# Patient Record
Sex: MALE | Race: WHITE | NOT HISPANIC OR LATINO | Employment: FULL TIME | URBAN - METROPOLITAN AREA
[De-identification: names, ages, dates, MRNs, and addresses within clinical notes are randomized per-mention and may not be internally consistent; named-entity substitution may affect disease eponyms.]

---

## 2018-08-28 ENCOUNTER — HOSPITAL ENCOUNTER (EMERGENCY)
Facility: HOSPITAL | Age: 33
Discharge: HOME | End: 2018-08-28
Attending: EMERGENCY MEDICINE | Admitting: EMERGENCY MEDICINE
Payer: COMMERCIAL

## 2018-08-28 VITALS
WEIGHT: 238 LBS | OXYGEN SATURATION: 96 % | HEART RATE: 66 BPM | SYSTOLIC BLOOD PRESSURE: 140 MMHG | TEMPERATURE: 97.4 F | DIASTOLIC BLOOD PRESSURE: 77 MMHG | RESPIRATION RATE: 16 BRPM
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
## 2018-08-28 DIAGNOSIS — L03.90 CELLULITIS: ICD-10-CM

## 2018-08-28 PROCEDURE — 99281 EMR DPT VST MAYX REQ PHY/QHP: CPT

## 2018-08-28 RX ORDER — AMOXICILLIN AND CLAVULANATE POTASSIUM 875; 125 MG/1; MG/1
1 TABLET, FILM COATED ORAL EVERY 12 HOURS SCHEDULED
Qty: 10 TABLET | Refills: 0 | Status: SHIPPED | OUTPATIENT
Start: 2018-08-29 | End: 2018-09-03

## 2018-08-28 RX ORDER — AMOXICILLIN AND CLAVULANATE POTASSIUM 875; 125 MG/1; MG/1
1 TABLET, FILM COATED ORAL ONCE
Status: COMPLETED | OUTPATIENT
Start: 2018-08-28 | End: 2018-08-28

## 2018-08-28 RX ADMIN — AMOXICILLIN AND CLAVULANATE POTASSIUM 1 TABLET: 875; 125 TABLET, FILM COATED ORAL at 23:51

## 2018-08-29 NOTE — ED NOTES
Pt presents to ED with what appears to be a bite nemo surrounded with ecchymosis on back of left upper leg  Pt states he noticed it after his long bike ride yesterday  Denies fever or chills at home        Nicolasa Cleveland RN  08/28/18 [address] [employer] ANNY Paz  08/28/18 8719

## 2018-08-29 NOTE — ED PROVIDER NOTES
History  Chief Complaint   Patient presents with   [address]     area noted behind left thigh theat appears to be an insect bite, client circled it 3 hours ago     68-year-old white male up-to-date with tetanus presents for evaluation of inset bike on the posterior medial aspect of his left distal thigh  No fever, no streaking, no drainage, mild tenderness to palpation  Patient complains of rash in slight discomfort to the area  History provided by:  Patient  Insect Bite   Location:  Leg  Leg injury location:  L upper leg  Time since incident:  3 hours  Pain details:     Quality:  Aching and sore    Severity:  Mild    Timing:  Unable to specify    Progression:  Unchanged  Incident location:  Unable to specify  Tetanus status:  Up to date  Relieved by:  Nothing  Worsened by:  Nothing  Ineffective treatments:  OTC medications and rest  Associated symptoms: rash    Associated symptoms: no fever and no swelling        None       History reviewed  No pertinent past medical history  Past Surgical History:   Procedure Laterality Date    TONSILLECTOMY         History reviewed  No pertinent family history  I have reviewed and agree with the history as documented  Social History   Substance Use Topics    Smoking status: Never Smoker    Smokeless tobacco: Never Used    Alcohol use No        Review of Systems   Constitutional: Negative for chills and fever  HENT: Negative  Respiratory: Negative  Cardiovascular: Negative  Gastrointestinal: Negative  Genitourinary: Negative  Musculoskeletal: Negative  Skin: Positive for rash  Neurological: Negative  Hematological: Negative  Psychiatric/Behavioral: Negative  All other systems reviewed and are negative  Physical Exam  Physical Exam   Constitutional: He is oriented to person, place, and time  He appears well-developed and well-nourished  HENT:   Head: Normocephalic and atraumatic     Right Ear: External ear normal    Left Ear: External ear normal    Nose: Nose normal    Mouth/Throat: Oropharynx is clear and moist    Eyes: Conjunctivae and EOM are normal    Neck: Normal range of motion  Neck supple  Cardiovascular: Normal rate, regular rhythm and intact distal pulses  Pulmonary/Chest: Effort normal and breath sounds normal    Abdominal: Soft  Bowel sounds are normal    Musculoskeletal: Normal range of motion  Neurological: He is alert and oriented to person, place, and time  Skin: Skin is warm and dry  Capillary refill takes less than 2 seconds  Well circumscribed area of erythema and ecchymosis with a central insect bite nemo  No fever, no drainage, no streaking   Psychiatric: He has a normal mood and affect  His behavior is normal  Judgment and thought content normal    Nursing note and vitals reviewed  Vital Signs  ED Triage Vitals [08/28/18 2233]   Temperature Pulse Respirations Blood Pressure SpO2   (!) 97 4 °F (36 3 °C) 66 16 140/77 96 %      Temp Source Heart Rate Source Patient Position - Orthostatic VS BP Location FiO2 (%)   Tympanic Monitor Sitting Right arm --      Pain Score       1           Vitals:    08/28/18 2233   BP: 140/77   Pulse: 66   Patient Position - Orthostatic VS: Sitting       Visual Acuity      ED Medications  Medications   amoxicillin-clavulanate (AUGMENTIN) 875-125 mg per tablet 1 tablet (not administered)       Diagnostic Studies  Results Reviewed     None                 No orders to display              Procedures  Procedures       Phone Contacts  ED Phone Contact    ED Course                               MDM  CritCare Time    Disposition  Final diagnoses:   Insect bite   Cellulitis     Time reflects when diagnosis was documented in both MDM as applicable and the Disposition within this note     Time User Action Codes Description Comment    8/28/2018 11:44 PM Sagrario Solimano Said  DJIQ] Insect bite     8/28/2018 11:44 PM Sagrario Garcia [L03 [id number]] Cellulitis       ED Disposition     ED Disposition Condition Comment    Discharge  [facility] discharge to home/self care  Condition at discharge: Stable        Follow-up Information     Follow up With Specialties Details Why Contact Info Additional Information    [facility] Emergency Medicine In 2 days For wound re-check [address] [facility], [place], [place], [address]          Patient's Medications   Discharge Prescriptions    AMOXICILLIN-CLAVULANATE (AUGMENTIN) 875-125 MG PER TABLET    Take 1 tablet by mouth every 12 (twelve) hours for 5 days       Start Date: 8/29/2018 End Date: 9/3/2018       Order Dose: 1 tablet       Quantity: 10 tablet    Refills: 0     No discharge procedures on file      ED Provider  Electronically Signed by           Alycia Dorantes MD  08/28/18 5250

## 2018-08-29 NOTE — DISCHARGE INSTRUCTIONS
Cellulitis   WHAT YOU NEED TO KNOW:   Cellulitis is a skin infection caused by bacteria  Cellulitis may go away on its own or you may need treatment  Your healthcare provider may draw a [place] around the outside edges of your cellulitis  If your cellulitis spreads, your healthcare provider will see it outside of the [place]  DISCHARGE INSTRUCTIONS:   Call 911 if:   · You have sudden trouble breathing or chest pain  Return to the emergency department if:   · Your wound gets larger and more painful  · You feel a crackling under your skin when you touch it  · You have purple dots or bumps on your skin, or you see bleeding under your skin  · You have new swelling and pain in your legs  · The red, warm, swollen area gets larger  · You see red streaks coming from the infected area  Contact your healthcare provider if:   · You have a fever  · Your fever or pain does not go away or gets worse  · The area does not get smaller after 2 days of antibiotics  · Your skin is flaking or peeling off  · You have questions or concerns about your condition or care  Medicines:   · Antibiotics  help treat the bacterial infection  · NSAIDs , such as ibuprofen, help decrease swelling, pain, and fever  NSAIDs can cause stomach bleeding or kidney problems in certain people  If you take blood thinner medicine, always ask if NSAIDs are safe for you  Always read the medicine label and follow directions  Do not give these medicines to children under 10months of age without direction from your child's healthcare provider  · Acetaminophen  decreases pain and fever  It is available without a doctor's order  Ask how much to take and how often to take it  Follow directions  Read the labels of all other medicines you are using to see if they also contain acetaminophen, or ask your doctor or pharmacist  Acetaminophen can cause liver damage if not taken correctly   Do not use more than 4 grams (4,000 milligrams) total of acetaminophen in one day  · Take your medicine as directed  Contact your healthcare provider if you think your medicine is not helping or if you have side effects  Tell him or her if you are allergic to any medicine  Keep a list of the medicines, vitamins, and herbs you take  Include the amounts, and when and why you take them  Bring the list or the pill bottles to follow-up visits  Carry your medicine list with you in case of an emergency  Self-care:   · Elevate the area above the level of your heart  as often as you can  This will help decrease swelling and pain  Prop the area on pillows or blankets to keep it elevated comfortably  · Clean the area daily until the wound scabs over  Gently wash the area with soap and water  Pat dry  Use dressings as directed  · Place cool or warm, wet cloths on the area as directed  Use clean cloths and clean water  Leave it on the area until the cloth is room temperature  Pat the area dry with a clean, dry cloth  The cloths may help decrease pain  Prevent cellulitis:   · Do not scratch bug bites or areas of injury  You increase your risk for cellulitis by scratching these areas  · Do not share personal items, such as towels, clothing, and razors  · Clean exercise equipment  with germ-killing  before and after you use it  · Wash your hands often  Use soap and water  Wash your hands after you use the bathroom, change a child's diapers, or sneeze  Wash your hands before you prepare or eat food  Use lotion to prevent dry, cracked skin  · Wear pressure stockings as directed  You may be told to wear the stockings if you have peripheral edema  The stockings improve blood flow and decrease swelling  · Treat athlete's foot  This can help prevent the spread of a bacterial skin infection  Follow up with your healthcare provider within 3 days, or as directed:   Your healthcare provider will check if your cellulitis is getting better  You may need different medicine  Write down your questions so you remember to ask them during your visits  © 2017 [employer]  Information is for End User's use only and may not be sold, redistributed or otherwise used for commercial purposes  All illustrations and images included in CareNotes® are the copyrighted property of [employer]  or [employer]  The above information is an  only  It is not intended as medical advice for individual conditions or treatments  Talk to your doctor, nurse or pharmacist before following any medical regimen to see if it is safe and effective for you  Insect Bite or Sting   WHAT YOU NEED TO KNOW:   Most insect bites and stings are not dangerous and go away without treatment  Your symptoms may be mild, or you may develop anaphylaxis  Anaphylaxis is a sudden, life-threatening reaction that needs immediate treatment  Common examples of insects that bite or sting are bees, ticks, mosquitoes, spiders, and ants  Insect bites or stings can lead to diseases such as malaria, West Nile virus, Lyme disease, or [place] Spotted Fever  DISCHARGE INSTRUCTIONS:   Call 911 for signs or symptoms of anaphylaxis,  such as trouble breathing, swelling in your mouth or throat, or wheezing  You may also have itching, a rash, hives, or feel like you are going to faint  Return to the emergency department if:   · You are stung on your tongue or in your throat  · A white area forms around the bite  · You are sweating badly or have body pain  · You think you were bitten or stung by a poisonous insect  Contact your healthcare provider if:   · You have a fever  · The area becomes red, warm, tender, and swollen beyond the area of the bite or sting  · You have questions or concerns about your condition or care  Medicines:   · Antihistamines  decrease itching and rash       · Epinephrine  is used to treat severe allergic reactions such as anaphylaxis  · Take your medicine as directed  Contact your healthcare provider if you think your medicine is not helping or if you have side effects  Tell him of her if you are allergic to any medicine  Keep a list of the medicines, vitamins, and herbs you take  Include the amounts, and when and why you take them  Bring the list or the pill bottles to follow-up visits  Carry your medicine list with you in case of an emergency  Steps to take for signs or symptoms of anaphylaxis:   · Immediately  give 1 shot of epinephrine only into the outer thigh muscle  · Leave the shot in place  as directed  Your healthcare provider may recommend you leave it in place for up to 10 seconds before you remove it  This helps make sure all of the epinephrine is delivered  · Call 911 and go to the emergency department,  even if the shot improved symptoms  Do not drive yourself  Bring the used epinephrine shot with you  Safety precautions to take if you are at risk for anaphylaxis:   · Keep 2 shots of epinephrine with you at all times  You may need a second shot, because epinephrine only works for about 20 minutes and symptoms may return  Your healthcare provider can show you and family members how to give the shot  Check the expiration date every month and replace it before it expires  · Create an action plan  Your healthcare provider can help you create a written plan that explains the allergy and an emergency plan to treat a reaction  The plan explains when to give a second epinephrine shot if symptoms return or do not improve after the first  Give copies of the action plan and emergency instructions to family members, work and school staff, and  providers  Show them how to give a shot of epinephrine  · Carry medical alert identification  Wear medical alert jewelry or carry a card that says you have an insect allergy  Ask your healthcare provider where to get these items    If an insect bites or stings you:   · Remove the stinger  Scrape the stinger out with your fingernail, edge of a credit card, or a knife blade  Do not squeeze the wound  Gently wash the area with soap and water  · Remove the tick  Ticks must be removed as soon as possible so you do not get diseases passed through tick bites  Ask your healthcare provider for more information on tick bites and how to remove ticks  Care for a bite or sting wound:   · Elevate the affected area  Prop the wound above the level of your heart, if possible  Elevate the area for 10 to 20 minutes each hour or as directed by your healthcare provider  · Use compresses  Soak a clean washcloth in cold water, wring it out, and put it on the bite or sting  Use the compress for 10 to 20 minutes each hour or as directed by your healthcare provider  After 24 to 48 hours, change to warm compresses  · Apply a paste  Add water to baking soda to make a thick paste  Put the paste on the area for 5 minutes  Rinse gently to remove the paste  Prevent another insect bite or sting:   · Do not wear bright-colored or flower-print clothing when you plan to spend time outdoors  Do not use hairspray, perfumes, or aftershave  · Do not leave food out  · Empty any standing water and wash container with soap and water every 2 days  · Put screens on all open windows and doors  · Put insect repellent that contains DEET on skin that is showing when you go outside  Put insect repellent at the top of your boots, bottom of pant legs, and sleeve cuffs  Wear long sleeves, pants, and shoes  · Use citronella candles outdoors to help keep mosquitoes away  Put a tick and flea collar on pets  Follow up with your healthcare provider as directed:  Write down your questions so you remember to ask them during your visits    © 2017 [employer] Information is for End User's use only and may not be sold, redistributed or otherwise used for commercial purposes  All illustrations and images included in CareNotes® are the copyrighted property of A [employer] M , Inc  or Jose De Jesus Cohen  The above information is an  only  It is not intended as medical advice for individual conditions or treatments  Talk to your doctor, nurse or pharmacist before following any medical regimen to see if it is safe and effective for you

## 2022-12-26 ENCOUNTER — OFFICE VISIT (OUTPATIENT)
Dept: URGENT CARE | Facility: CLINIC | Age: 37
End: 2022-12-26
Payer: COMMERCIAL

## 2022-12-26 VITALS
TEMPERATURE: 96.9 F | OXYGEN SATURATION: 94 % | SYSTOLIC BLOOD PRESSURE: 145 MMHG | WEIGHT: 249 LBS | DIASTOLIC BLOOD PRESSURE: 79 MMHG | HEART RATE: 74 BPM | RESPIRATION RATE: 18 BRPM

## 2022-12-26 DIAGNOSIS — R05.3 PERSISTENT COUGH: Primary | ICD-10-CM

## 2022-12-26 PROCEDURE — 99213 OFFICE O/P EST LOW 20 MIN: CPT | Performed by: PHYSICIAN ASSISTANT

## 2022-12-26 RX ORDER — BENZONATATE 200 MG/1
200 CAPSULE ORAL 3 TIMES DAILY PRN
Qty: 20 CAPSULE | Refills: 0 | Status: SHIPPED | OUTPATIENT
Start: 2022-12-26 | End: 2023-07-19 | Stop reason: ALTCHOICE

## 2022-12-26 NOTE — PROGRESS NOTES
[facility]        NAME: Jay Patiño is a 42 y.o. male  : 1985    MRN: [id number]  DATE: 2022  TIME: 2:48 PM    Assessment and Plan   Persistent cough [R05.3]  1. Persistent cough  benzonatate (TESSALON) 200 MG capsule        Patient Instructions   Persistent cough following viral URI  rx tessalon pearls three times per day sent via EMR  Recommend flonase nasal spray and sudafed for postnasal drip/cough  Warm salt water gargles  Rest, fluids and supportive care  May benefit from a cool mist humidifier on night stand  Tylenol/ibuprofen as needed for pain/fever    Follow up with PCP in 3-5 days. Proceed to  ER if symptoms worsen. Chief Complaint     Chief Complaint   Patient presents with   • Cough         History of Present Illness       Sharon Martin is a 68-year-old male who presents to clinic complaining of cough x2 weeks. He states it was dry but is now productive and worse when he lays down at night. He also states he had a fever last week but none since. He is also complaining of headache, bilateral ear pain, and postnasal drip. He denies any chills, fatigue, myalgias, nasal congestion, rhinorrhea, sore throat, shortness of breath, nausea, vomiting, diarrhea, loss of taste or smell, recent travel, or exposure to anyone known COVID-positive. Review of Systems   Review of Systems   Constitutional: Negative for chills, fatigue and fever. HENT: Positive for ear pain and postnasal drip. Negative for congestion, rhinorrhea, sinus pressure, sinus pain and sore throat. Respiratory: Positive for cough. Negative for shortness of breath. Gastrointestinal: Negative for diarrhea, nausea and vomiting. Musculoskeletal: Negative for myalgias. Neurological: Positive for headaches. Negative for dizziness.          Current Medications       Current Outpatient Medications:   •  benzonatate (TESSALON) 200 MG capsule, Take 1 capsule (200 mg total) by mouth 3 (three) times a day as needed for cough, Disp: 20 capsule, Rfl: 0    Current Allergies     Allergies as of 12/26/2022   • (No Known Allergies)            The following portions of the patient's history were reviewed and updated as appropriate: allergies, current medications, past family history, past medical history, past social history, past surgical history and problem list.     History reviewed. No pertinent past medical history. Past Surgical History:   Procedure Laterality Date   • TONSILLECTOMY         No family history on file. Medications have been verified. Objective   /79   Pulse 74   Temp (!) 96.9 °F (36.1 °C)   Resp 18   Wt 113 kg (249 lb)   SpO2 94%   No LMP for male patient. Physical Exam     Physical Exam  Vitals and nursing note reviewed. Constitutional:       General: He is not in acute distress. Appearance: Normal appearance. He is not ill-appearing. HENT:      Right Ear: Tympanic membrane, ear canal and external ear normal.      Left Ear: Tympanic membrane, ear canal and external ear normal.      Nose: Nose normal.      Mouth/Throat:      Mouth: Mucous membranes are moist.      Pharynx: No oropharyngeal exudate or posterior oropharyngeal erythema. Cardiovascular:      Rate and Rhythm: Normal rate and regular rhythm. Heart sounds: Normal heart sounds. Pulmonary:      Effort: Pulmonary effort is normal. No respiratory distress. Breath sounds: Normal breath sounds. No stridor. No wheezing, rhonchi or rales. Lymphadenopathy:      Cervical: No cervical adenopathy. Neurological:      Mental Status: He is alert and oriented to person, place, and time.    Psychiatric:         Mood and Affect: Mood normal.         Behavior: Behavior normal.

## 2023-07-19 ENCOUNTER — OFFICE VISIT (OUTPATIENT)
Dept: FAMILY MEDICINE CLINIC | Facility: CLINIC | Age: 38
End: 2023-07-19
Payer: COMMERCIAL

## 2023-07-19 ENCOUNTER — APPOINTMENT (OUTPATIENT)
Dept: RADIOLOGY | Facility: CLINIC | Age: 38
End: 2023-07-19
Payer: COMMERCIAL

## 2023-07-19 VITALS
RESPIRATION RATE: 16 BRPM | HEART RATE: 57 BPM | WEIGHT: 242 LBS | OXYGEN SATURATION: 96 % | SYSTOLIC BLOOD PRESSURE: 130 MMHG | HEIGHT: 71 IN | TEMPERATURE: 97.9 F | DIASTOLIC BLOOD PRESSURE: 60 MMHG | BODY MASS INDEX: 33.88 KG/M2

## 2023-07-19 DIAGNOSIS — Z13.83 SCREENING FOR CARDIOVASCULAR, RESPIRATORY, AND GENITOURINARY DISEASES: ICD-10-CM

## 2023-07-19 DIAGNOSIS — Z11.4 SCREENING FOR HIV (HUMAN IMMUNODEFICIENCY VIRUS): ICD-10-CM

## 2023-07-19 DIAGNOSIS — M54.6 ACUTE LEFT-SIDED THORACIC BACK PAIN: Primary | ICD-10-CM

## 2023-07-19 DIAGNOSIS — Z13.89 SCREENING FOR CARDIOVASCULAR, RESPIRATORY, AND GENITOURINARY DISEASES: ICD-10-CM

## 2023-07-19 DIAGNOSIS — Z11.59 NEED FOR HEPATITIS C SCREENING TEST: ICD-10-CM

## 2023-07-19 DIAGNOSIS — Z13.29 SCREENING FOR THYROID DISORDER: ICD-10-CM

## 2023-07-19 DIAGNOSIS — M54.6 ACUTE LEFT-SIDED THORACIC BACK PAIN: ICD-10-CM

## 2023-07-19 DIAGNOSIS — Z13.6 SCREENING FOR CARDIOVASCULAR, RESPIRATORY, AND GENITOURINARY DISEASES: ICD-10-CM

## 2023-07-19 PROCEDURE — 99214 OFFICE O/P EST MOD 30 MIN: CPT | Performed by: FAMILY MEDICINE

## 2023-07-19 PROCEDURE — 72072 X-RAY EXAM THORAC SPINE 3VWS: CPT

## 2023-07-19 PROCEDURE — 71101 X-RAY EXAM UNILAT RIBS/CHEST: CPT

## 2023-07-19 RX ORDER — IBUPROFEN 400 MG/1
TABLET ORAL EVERY 6 HOURS PRN
COMMUNITY

## 2023-07-19 NOTE — PROGRESS NOTES
Name: Anthony Hammond      : 1985      MRN: [id number]  Encounter Provider: Roman Espinoza MD  Encounter Date: 2023   Encounter department: [facility]     1. Acute left-sided thoracic back pain  -     XR spine thoracic 3 vw; Future; Expected date: 2023  -     XR ribs 2 vw left; Future; Expected date: 2023    2. Need for hepatitis C screening test  -     Hepatitis C Antibody; Future    3. Screening for HIV (human immunodeficiency virus)  -     HIV 1/2 AG/AB w Reflex [facility] for 2 yr old and above; Future    4. Screening for cardiovascular, respiratory, and genitourinary diseases  -     Comprehensive metabolic panel; Future  -     CBC and differential; Future  -     Lipid Panel with Direct LDL reflex; Future    5. Screening for thyroid disorder  -     TSH, 3rd generation with Free T4 reflex; Future       Continue physical therapy which he has been doing. Will get imaging. Continue as needed ibuprofen. He would like to avoid any prescription pain medications. Recommend heat to the area, stretches, rest.     Subjective      Back Pain  This is a new problem. Episode onset: 6 weeks ago while doing [employer]. The problem occurs intermittently. The problem has been gradually improving since onset. Pain location: left thoracic region. Quality: pinching, sharp. The pain does not radiate. The pain is moderate. The symptoms are aggravated by standing and twisting. Pertinent negatives include no abdominal pain, bladder incontinence, bowel incontinence, chest pain, dysuria, fever, headaches, leg pain, numbness, paresis, paresthesias, pelvic pain, perianal numbness, tingling, weakness or weight loss. He has tried home exercises and NSAIDs for the symptoms. Review of Systems   Constitutional: Negative for fever and weight loss. Cardiovascular: Negative for chest pain. Gastrointestinal: Negative for abdominal pain and bowel incontinence.    Genitourinary: Negative for bladder incontinence, dysuria and pelvic pain. Musculoskeletal: Positive for back pain. Neurological: Negative for tingling, weakness, numbness, headaches and paresthesias. Current Outpatient Medications on File Prior to Visit   Medication Sig   • ibuprofen (MOTRIN) 400 mg tablet Take by mouth every 6 (six) hours as needed for mild pain   • [DISCONTINUED] benzonatate (TESSALON) 200 MG capsule Take 1 capsule (200 mg total) by mouth 3 (three) times a day as needed for cough       Objective     /60 (BP Location: Right arm, Patient Position: Sitting, Cuff Size: Large)   Pulse 57   Temp 97.9 °F (36.6 °C) (Temporal)   Resp 16   Ht 5' 10.5" (1.791 m)   Wt 110 kg (242 lb)   SpO2 96%   BMI 34.23 kg/m²     Physical Exam  Constitutional:       General: He is not in acute distress. Appearance: He is well-developed. He is not diaphoretic. HENT:      Head: Normocephalic and atraumatic. Eyes:      General: No scleral icterus. Right eye: No discharge. Left eye: No discharge. Conjunctiva/sclera: Conjunctivae normal.   Pulmonary:      Effort: Pulmonary effort is normal.   Musculoskeletal:      Cervical back: Normal range of motion. Thoracic back: Tenderness (left paraspinal region) present. No swelling, edema, deformity, signs of trauma, lacerations, spasms or bony tenderness. Decreased range of motion. No scoliosis. Lumbar back: Normal.   Skin:     General: Skin is warm. Neurological:      Mental Status: He is alert and oriented to person, place, and time. Psychiatric:         Behavior: Behavior normal.         Thought Content:  Thought content normal.         Judgment: Judgment normal.       Familia Person MD

## 2025-01-06 ENCOUNTER — OFFICE VISIT (OUTPATIENT)
Dept: FAMILY MEDICINE CLINIC | Facility: CLINIC | Age: 40
End: 2025-01-06
Payer: COMMERCIAL

## 2025-01-06 VITALS
SYSTOLIC BLOOD PRESSURE: 138 MMHG | DIASTOLIC BLOOD PRESSURE: 76 MMHG | HEART RATE: 72 BPM | HEIGHT: 71 IN | WEIGHT: 259 LBS | TEMPERATURE: 98.1 F | BODY MASS INDEX: 36.26 KG/M2

## 2025-01-06 DIAGNOSIS — Z13.6 SCREENING FOR CARDIOVASCULAR CONDITION: ICD-10-CM

## 2025-01-06 DIAGNOSIS — S46.111D LABRAL TEAR OF LONG HEAD OF RIGHT BICEPS TENDON, SUBSEQUENT ENCOUNTER: Primary | ICD-10-CM

## 2025-01-06 DIAGNOSIS — Z13.29 SCREENING FOR THYROID DISORDER: ICD-10-CM

## 2025-01-06 DIAGNOSIS — R53.83 OTHER FATIGUE: ICD-10-CM

## 2025-01-06 PROCEDURE — 99214 OFFICE O/P EST MOD 30 MIN: CPT | Performed by: NURSE PRACTITIONER

## 2025-01-06 RX ORDER — CYCLOBENZAPRINE HCL 10 MG
10 TABLET ORAL
Qty: 14 TABLET | Refills: 0 | Status: SHIPPED | OUTPATIENT
Start: 2025-01-06

## 2025-01-06 NOTE — PROGRESS NOTES
Name: Luis Stinson      : 1985      MRN: [id number]  Encounter Provider: BRANDYN Geller  Encounter Date: 2025   Encounter department: [facility]  :  Assessment & Plan  Labral tear of long head of right biceps tendon, subsequent encounter  Given exacerbation of known injury, will attempt to get MRI arthrogram for further evaluation.  Discussed pain control given that he is requiring NSAIDs, he is agreeable to trying a topical with Tylenol.  Will follow-up with test results  Orders:  •  MRI arthrogram right shoulder; Future  •  cyclobenzaprine (FLEXERIL) 10 mg tablet; Take 1 tablet (10 mg total) by mouth daily at bedtime  •  Diclofenac Sodium (VOLTAREN) 1 %; Apply 2 g topically 4 (four) times a day    Screening for cardiovascular condition    Orders:  •  CBC and differential; Future  •  Comprehensive metabolic panel; Future  •  Lipid Panel with Direct LDL reflex; Future    Screening for thyroid disorder    Orders:  •  TSH, 3rd generation with Free T4 reflex; Future    Other fatigue    Orders:  •  Testosterone, free, total; Future  •  PSA, Total Screen; Future           History of Present Illness     Here today with complaints of right shoulder pain.  He has had chronic right shoulder pain beginning at a young age lifting weights.  He did undergo MRI in 2017 which showed a tear of the left shoulder labrum as well as biceps head tendinitis.  He did see an orthopedic at the time who had recommended surgery, however he did not want to proceed with surgical management.  He has been managing on his own until recently.  Pain was exacerbated approximately 6 weeks ago after doing push-ups at wrestling practice  Reports he had immediate discomfort described as a tearing sensation.  He has had continued pain since with arm heaviness, decreased strength.  He has been taking NSAIDs, which dulled the pain.  He is unable to sleep in a flat position      Review of Systems   Constitutional:  "Negative.    Musculoskeletal:         See HPI   Neurological:  Positive for weakness. Negative for numbness.       Objective   /76   Pulse 72   Temp 98.1 °F (36.7 °C)   Ht 5' 10.5\" (1.791 m)   Wt 117 kg (259 lb)   BMI 36.64 kg/m²      Physical Exam  Vitals and nursing note reviewed.   Constitutional:       General: He is not in acute distress.     Appearance: Normal appearance. He is well-developed. He is not diaphoretic.   Eyes:      Conjunctiva/sclera: Conjunctivae normal.   Pulmonary:      Effort: Pulmonary effort is normal. No respiratory distress.   Musculoskeletal:      Right shoulder: Tenderness present. No swelling or deformity. Decreased range of motion. Decreased strength.      Comments: Flexion/extension of elbow elicits pain in right shoulder  Hand grasp normal  Limited abduction of arm   Neurological:      Mental Status: He is alert.   Psychiatric:         Mood and Affect: Mood normal.         Behavior: Behavior normal.         "

## 2025-01-14 ENCOUNTER — HOSPITAL ENCOUNTER (OUTPATIENT)
Dept: MRI IMAGING | Facility: HOSPITAL | Age: 40
Discharge: HOME | End: 2025-01-14
Payer: COMMERCIAL

## 2025-01-14 ENCOUNTER — HOSPITAL ENCOUNTER (OUTPATIENT)
Dept: RADIOLOGY | Facility: HOSPITAL | Age: 40
Discharge: HOME | End: 2025-01-14
Payer: COMMERCIAL

## 2025-01-14 DIAGNOSIS — S46.111D LABRAL TEAR OF LONG HEAD OF RIGHT BICEPS TENDON, SUBSEQUENT ENCOUNTER: ICD-10-CM

## 2025-01-14 PROCEDURE — 73222 MRI JOINT UPR EXTREM W/DYE: CPT

## 2025-01-14 PROCEDURE — 77002 NEEDLE LOCALIZATION BY XRAY: CPT

## 2025-01-14 PROCEDURE — A9585 GADOBUTROL INJECTION: HCPCS | Performed by: NURSE PRACTITIONER

## 2025-01-14 PROCEDURE — 23350 INJECTION FOR SHOULDER X-RAY: CPT

## 2025-01-14 RX ORDER — LIDOCAINE HYDROCHLORIDE 10 MG/ML
30 INJECTION, SOLUTION EPIDURAL; INFILTRATION; INTRACAUDAL; PERINEURAL
Status: COMPLETED | OUTPATIENT
Start: 2025-01-14 | End: 2025-01-14

## 2025-01-14 RX ORDER — ROPIVACAINE HYDROCHLORIDE 2 MG/ML
10 INJECTION, SOLUTION EPIDURAL; INFILTRATION; PERINEURAL ONCE
Status: COMPLETED | OUTPATIENT
Start: 2025-01-14 | End: 2025-01-14

## 2025-01-14 RX ORDER — GADOBUTROL 604.72 MG/ML
2 INJECTION INTRAVENOUS
Status: COMPLETED | OUTPATIENT
Start: 2025-01-14 | End: 2025-01-14

## 2025-01-14 RX ADMIN — IOHEXOL 1 ML: 300 INJECTION, SOLUTION INTRAVENOUS at 13:22

## 2025-01-14 RX ADMIN — GADOBUTROL 1 ML: 604.72 INJECTION INTRAVENOUS at 13:22

## 2025-01-14 RX ADMIN — LIDOCAINE HYDROCHLORIDE 4 ML: 10 INJECTION, SOLUTION EPIDURAL; INFILTRATION; INTRACAUDAL; PERINEURAL at 13:24

## 2025-01-14 RX ADMIN — ROPIVACAINE HYDROCHLORIDE 2 ML: 2 INJECTION, SOLUTION EPIDURAL; INFILTRATION at 13:23

## 2025-01-16 ENCOUNTER — RESULTS FOLLOW-UP (OUTPATIENT)
Dept: FAMILY MEDICINE CLINIC | Facility: CLINIC | Age: 40
End: 2025-01-16

## 2025-01-16 ENCOUNTER — TELEPHONE (OUTPATIENT)
Dept: FAMILY MEDICINE CLINIC | Facility: CLINIC | Age: 40
End: 2025-01-16

## 2025-01-16 DIAGNOSIS — M67.912 TENDINOPATHY OF LEFT ROTATOR CUFF: ICD-10-CM

## 2025-01-16 DIAGNOSIS — M67.919 TENDINOPATHY OF ROTATOR CUFF, UNSPECIFIED LATERALITY: ICD-10-CM

## 2025-01-16 DIAGNOSIS — S46.111D LABRAL TEAR OF LONG HEAD OF RIGHT BICEPS TENDON, SUBSEQUENT ENCOUNTER: Primary | ICD-10-CM

## 2025-01-16 NOTE — TELEPHONE ENCOUNTER
Spoke with patient regarding MRI results.  His pain is improved with the muscle relaxant, however he has not been very active, and is worried about aggravating this.  Referral placed for orthopedic eval.  He will call to schedule. BRANDYN Geller

## 2025-05-15 ENCOUNTER — OFFICE VISIT (OUTPATIENT)
Dept: URGENT CARE | Facility: CLINIC | Age: 40
End: 2025-05-15
Payer: COMMERCIAL

## 2025-05-15 VITALS
SYSTOLIC BLOOD PRESSURE: 116 MMHG | HEART RATE: 63 BPM | TEMPERATURE: 96.9 F | BODY MASS INDEX: 36.64 KG/M2 | WEIGHT: 259 LBS | OXYGEN SATURATION: 98 % | DIASTOLIC BLOOD PRESSURE: 78 MMHG | RESPIRATION RATE: 14 BRPM

## 2025-05-15 DIAGNOSIS — H57.11 ACUTE RIGHT EYE PAIN: Primary | ICD-10-CM

## 2025-05-15 PROCEDURE — 99213 OFFICE O/P EST LOW 20 MIN: CPT | Performed by: PHYSICIAN ASSISTANT

## 2025-05-15 RX ORDER — TETRACAINE HYDROCHLORIDE 5 MG/ML
1 SOLUTION OPHTHALMIC ONCE
Status: COMPLETED | OUTPATIENT
Start: 2025-05-15 | End: 2025-05-15

## 2025-05-15 RX ORDER — OFLOXACIN 3 MG/ML
1 SOLUTION/ DROPS OPHTHALMIC 4 TIMES DAILY
Qty: 5 ML | Refills: 0 | Status: SHIPPED | OUTPATIENT
Start: 2025-05-15

## 2025-05-15 RX ADMIN — TETRACAINE HYDROCHLORIDE 1 DROP: 5 SOLUTION OPHTHALMIC at 11:03

## 2025-05-15 NOTE — PROGRESS NOTES
"  [facility]        NAME: Luis Stinson is a 39 y.o. male  : 1985    MRN: [id number]  DATE: May 15, 2025  TIME: 11:26 AM    Assessment and Plan   Acute right eye pain [H57.11]  1. Acute right eye pain  ofloxacin (OCUFLOX) 0.3 % ophthalmic solution    Ambulatory Referral to Ophthalmology    tetracaine 0.5 % ophthalmic solution 1 drop            Patient Instructions     Patient Instructions   Patient Education     Conjunctivitis (pink eye)   The Basics   Written by the doctors and editors at [facility]   What is pink eye? -- Pink eye is a term people use to describe an infection or irritation of the eye. The medical term for pink eye is \"conjunctivitis.\"  If you have pink eye, your eye (or eyes) might:   Turn pink or red   Weep or ooze a gooey liquid   Become itchy or burn   Get stuck shut, especially when you first wake up  Pink eye can be caused by an infection, allergies, or an unknown irritation.  Can you catch pink eye from someone else? -- Yes. When pink eye is caused by an infection, it can spread easily. Usually, people catch it from touching something that has been in contact with an infected person's eye. It can also be spread when an infected person touches someone else, and then that person touches their eye.  If someone you know has pink eye, avoid touching their pillowcases, towels, or other personal items.  When should I see a doctor or nurse? -- See your doctor or nurse if your eye hurts, or if you still have trouble seeing clearly after blinking. If you do not have these problems, but think you might have pink eye, your doctor or nurse might be able to give you advice over the phone.  Can pink eye be treated? -- Most cases of pink eye go away on their own without treatment. But some types of pink eye can be treated.  When pink eye is caused by infection, it is usually caused by a virus, so antibiotics will not help. Still, pink eye caused by a virus can last several days.   Pink " "eye caused by an infection with bacteria can be treated with antibiotic eye drops, gel, or ointment.   Pink eye caused by other problems can be treated with eye drops normally used to treat allergies. These drops will not cure the pink eye, but they can help with itchiness and irritation.  When using eye drops for infection, do not touch your healthy eye after touching your infected eye. Also, do not touch the bottle or dropper directly onto 1 eye and then use it in the other. These things can cause the infection to spread from 1 eye to the other.  If your eyelids feel swollen, it might also help to hold a cool wet cloth on the area.  What if I wear contact lenses? -- If you wear contact lenses and you have symptoms of pink eye, it is really important to have a doctor look at your eyes. In people who wear contacts, the symptoms of pink eye can be caused by \"corneal abrasion.\" Corneal abrasion is a scratch on the eye and can be a serious problem.  During treatment for eye infections, you might need to stop wearing your contacts for a short time. If your contacts are disposable, throw them away and use new ones. If your contacts are not disposable, you need to carefully clean them. You should also throw away your contact lens case and get a new one.  When can I go back to work or school? -- If you have pink eye caused by an infection, remember that it can spread very easily. The best way to avoid spreading it is to stay away from other people until you no longer have symptoms. If this is not possible, wash your hands often (figure 1). It's also important to avoid touching your eyes and sharing items that could spread the infection.  Schools and day cares usually have rules about when a child with pink eye can return. If a child has a bacterial infection, they will probably need to stay home until they have gotten antibiotic eye drops or ointment for 24 hours.  Can pink eye be prevented? -- To keep from getting or " spreading pink eye caused by an infection:   Wash your hands often with soap and water.   Try not to touch your eyes.   Avoid sharing towels, bedding, or other personal items with a person who has pink eye.  If your pink eye is caused by allergies, it might help to stay inside with the windows shut as much as possible during peak allergy seasons.  What problems should I watch for? -- Call your doctor or nurse if:   You have trouble seeing clearly after blinking.   Your eye is still red or has drainage after 3 days.   You have eye pain that is getting worse.  All topics are updated as new evidence becomes available and our peer review process is complete.  This topic retrieved from [facility] on: Feb 28, 2024.  Topic 25332 Version 20.0  Release: 32.2.4 - C32.58  © 2024 UpToDate, Inc. and/or its affiliates. All rights reserved.  figure 1: How to wash your hands     Wet your hands with clean water, and apply a small amount of soap. Lather and rub hands together for at least 20 seconds. Clean your wrists, palms, backs of your hands, between your fingers, tips of your fingers, thumbs, and under and around your nails. Rinse well, and dry your hands using a clean towel.  Graphic 169085 Version 7.0  Consumer Information Use and Disclaimer   Disclaimer: This generalized information is a limited summary of diagnosis, treatment, and/or medication information. It is not meant to be comprehensive and should be used as a tool to help the user understand and/or assess potential diagnostic and treatment options. It does NOT include all information about conditions, treatments, medications, side effects, or risks that may apply to a specific patient. It is not intended to be medical advice or a substitute for the medical advice, diagnosis, or treatment of a health care provider based on the health care provider's examination and assessment of a patient's specific and unique circumstances. Patients must speak with a health care  provider for complete information about their health, medical questions, and treatment options, including any risks or benefits regarding use of medications. This information does not endorse any treatments or medications as safe, effective, or approved for treating a specific patient. UpToDate, Inc. and its affiliates disclaim any warranty or liability relating to this information or the use thereof.The use of this information is governed by the Terms of Use, available at https://www.[employer].com/en/know/clinical-effectiveness-terms. 2024© UpToDate, Inc. and its affiliates and/or licensors. All rights reserved.  Copyright   © 2024 UpToDate, Inc. and/or its affiliates. All rights reserved.        Follow up with PCP in 3-5 days.  Proceed to  ER if symptoms worsen.    Chief Complaint     Chief Complaint   Patient presents with    Eye Pain     Pt presents with right eye discomfort started on Monday// scratchy , pressure in eye, redness outer site of eyeball         History of Present Illness       The patient is a 39-year-old male presenting today for right eye discomfort.  Reports that he started with symptoms 4 days ago.  He reports erythema of the eye.  Denies pain or itching.  Has not had any discharge from the eye.  Reports feeling like soreness poking his outer eye.        Review of Systems   Review of Systems   Constitutional:  Negative for activity change, appetite change, chills, diaphoresis and fever.   HENT:  Negative for congestion, ear pain, rhinorrhea and sore throat.    Eyes:  Positive for redness. Negative for pain, discharge and visual disturbance.   Respiratory:  Negative for cough, chest tightness and shortness of breath.    Cardiovascular:  Negative for chest pain and palpitations.   Gastrointestinal:  Negative for abdominal pain, diarrhea, nausea and vomiting.   Genitourinary:  Negative for dysuria and hematuria.   Musculoskeletal:  Negative for arthralgias, back pain and myalgias.   Skin:   Negative for color change, pallor and rash.   Neurological:  Negative for seizures, syncope and headaches.   All other systems reviewed and are negative.        Current Medications     Current Medications[1]    Current Allergies     Allergies as of 05/15/2025 - Reviewed 05/15/2025   Allergen Reaction Noted    Sulfa antibiotics Lip Swelling 01/06/2025            The following portions of the patient's history were reviewed and updated as appropriate: allergies, current medications, past family history, past medical history, past social history, past surgical history and problem list.     History reviewed. No pertinent past medical history.    Past Surgical History:   Procedure Laterality Date    FL INJECTION RIGHT SHOULDER (ARTHROGRAM)  1/14/2025    TONSILLECTOMY         History reviewed. No pertinent family history.      Medications have been verified.        Objective   /78   Pulse 63   Temp (!) 96.9 °F (36.1 °C) (Tympanic)   Resp 14   Wt 117 kg (259 lb)   SpO2 98%   BMI 36.64 kg/m²        Physical Exam     Physical Exam  Vitals and nursing note reviewed.   Constitutional:       General: He is not in acute distress.     Appearance: Normal appearance. He is normal weight. He is not ill-appearing, toxic-appearing or diaphoretic.     Eyes:      General: Lids are normal.      Conjunctiva/sclera:      Right eye: Right conjunctiva is injected. No chemosis, exudate or hemorrhage.     Left eye: Left conjunctiva is not injected. No chemosis, exudate or hemorrhage.     Comments: Erythema of lateral conjunctiva right eye.  No uptake with fluorescein strip.     Cardiovascular:      Rate and Rhythm: Normal rate.   Pulmonary:      Effort: Pulmonary effort is normal.     Neurological:      Mental Status: He is alert.                        [1]   Current Outpatient Medications:     ibuprofen (MOTRIN) 400 mg tablet, Take by mouth every 6 (six) hours as needed for mild pain, Disp: , Rfl:     ofloxacin (OCUFLOX) 0.3 %  ophthalmic solution, Administer 1 drop to the right eye 4 (four) times a day, Disp: 5 mL, Rfl: 0    cyclobenzaprine (FLEXERIL) 10 mg tablet, Take 1 tablet (10 mg total) by mouth daily at bedtime (Patient not taking: Reported on 5/15/2025), Disp: 14 tablet, Rfl: 0    Diclofenac Sodium (VOLTAREN) 1 %, Apply 2 g topically 4 (four) times a day (Patient not taking: Reported on 5/15/2025), Disp: 240 g, Rfl: 1  No current facility-administered medications for this visit.

## 2025-05-15 NOTE — PATIENT INSTRUCTIONS
"Patient Education     Conjunctivitis (pink eye)   The Basics   Written by the doctors and editors at [facility]   What is pink eye? -- Pink eye is a term people use to describe an infection or irritation of the eye. The medical term for pink eye is \"conjunctivitis.\"  If you have pink eye, your eye (or eyes) might:   Turn pink or red   Weep or ooze a gooey liquid   Become itchy or burn   Get stuck shut, especially when you first wake up  Pink eye can be caused by an infection, allergies, or an unknown irritation.  Can you catch pink eye from someone else? -- Yes. When pink eye is caused by an infection, it can spread easily. Usually, people catch it from touching something that has been in contact with an infected person's eye. It can also be spread when an infected person touches someone else, and then that person touches their eye.  If someone you know has pink eye, avoid touching their pillowcases, towels, or other personal items.  When should I see a doctor or nurse? -- See your doctor or nurse if your eye hurts, or if you still have trouble seeing clearly after blinking. If you do not have these problems, but think you might have pink eye, your doctor or nurse might be able to give you advice over the phone.  Can pink eye be treated? -- Most cases of pink eye go away on their own without treatment. But some types of pink eye can be treated.  When pink eye is caused by infection, it is usually caused by a virus, so antibiotics will not help. Still, pink eye caused by a virus can last several days.   Pink eye caused by an infection with bacteria can be treated with antibiotic eye drops, gel, or ointment.   Pink eye caused by other problems can be treated with eye drops normally used to treat allergies. These drops will not cure the pink eye, but they can help with itchiness and irritation.  When using eye drops for infection, do not touch your healthy eye after touching your infected eye. Also, do not touch the " "bottle or dropper directly onto 1 eye and then use it in the other. These things can cause the infection to spread from 1 eye to the other.  If your eyelids feel swollen, it might also help to hold a cool wet cloth on the area.  What if I wear contact lenses? -- If you wear contact lenses and you have symptoms of pink eye, it is really important to have a doctor look at your eyes. In people who wear contacts, the symptoms of pink eye can be caused by \"corneal abrasion.\" Corneal abrasion is a scratch on the eye and can be a serious problem.  During treatment for eye infections, you might need to stop wearing your contacts for a short time. If your contacts are disposable, throw them away and use new ones. If your contacts are not disposable, you need to carefully clean them. You should also throw away your contact lens case and get a new one.  When can I go back to work or school? -- If you have pink eye caused by an infection, remember that it can spread very easily. The best way to avoid spreading it is to stay away from other people until you no longer have symptoms. If this is not possible, wash your hands often (figure 1). It's also important to avoid touching your eyes and sharing items that could spread the infection.  Schools and day cares usually have rules about when a child with pink eye can return. If a child has a bacterial infection, they will probably need to stay home until they have gotten antibiotic eye drops or ointment for 24 hours.  Can pink eye be prevented? -- To keep from getting or spreading pink eye caused by an infection:   Wash your hands often with soap and water.   Try not to touch your eyes.   Avoid sharing towels, bedding, or other personal items with a person who has pink eye.  If your pink eye is caused by allergies, it might help to stay inside with the windows shut as much as possible during peak allergy seasons.  What problems should I watch for? -- Call your doctor or nurse if:   " You have trouble seeing clearly after blinking.   Your eye is still red or has drainage after 3 days.   You have eye pain that is getting worse.  All topics are updated as new evidence becomes available and our peer review process is complete.  This topic retrieved from [facility] on: Feb 28, 2024.  Topic 46182 Version 20.0  Release: 32.2.4 - C32.58  © 2024 UpToDate, Inc. and/or its affiliates. All rights reserved.  figure 1: How to wash your hands     Wet your hands with clean water, and apply a small amount of soap. Lather and rub hands together for at least 20 seconds. Clean your wrists, palms, backs of your hands, between your fingers, tips of your fingers, thumbs, and under and around your nails. Rinse well, and dry your hands using a clean towel.  Graphic 758453 Version 7.0  Consumer Information Use and Disclaimer   Disclaimer: This generalized information is a limited summary of diagnosis, treatment, and/or medication information. It is not meant to be comprehensive and should be used as a tool to help the user understand and/or assess potential diagnostic and treatment options. It does NOT include all information about conditions, treatments, medications, side effects, or risks that may apply to a specific patient. It is not intended to be medical advice or a substitute for the medical advice, diagnosis, or treatment of a health care provider based on the health care provider's examination and assessment of a patient's specific and unique circumstances. Patients must speak with a health care provider for complete information about their health, medical questions, and treatment options, including any risks or benefits regarding use of medications. This information does not endorse any treatments or medications as safe, effective, or approved for treating a specific patient. UpToDate, Inc. and its affiliates disclaim any warranty or liability relating to this information or the use thereof.The use of this  information is governed by the Terms of Use, available at https://www.wolterskluwer.com/en/know/clinical-effectiveness-terms. 2024© [employer] and its affiliates and/or licensors. All rights reserved.  Copyright   © 2024 [employer] and/or its affiliates. All rights reserved.

## 2025-06-29 ENCOUNTER — HOSPITAL ENCOUNTER (EMERGENCY)
Facility: HOSPITAL | Age: 40
Discharge: HOME | End: 2025-06-29
Attending: EMERGENCY MEDICINE
Payer: COMMERCIAL

## 2025-06-29 VITALS
TEMPERATURE: 98.4 F | DIASTOLIC BLOOD PRESSURE: 78 MMHG | RESPIRATION RATE: 20 BRPM | HEART RATE: 75 BPM | WEIGHT: 251.2 LBS | OXYGEN SATURATION: 97 % | SYSTOLIC BLOOD PRESSURE: 143 MMHG | BODY MASS INDEX: 35.53 KG/M2

## 2025-06-29 DIAGNOSIS — L03.115 CELLULITIS OF RIGHT KNEE: Primary | ICD-10-CM

## 2025-06-29 PROCEDURE — 99284 EMERGENCY DEPT VISIT MOD MDM: CPT | Performed by: PHYSICIAN ASSISTANT

## 2025-06-29 PROCEDURE — 99282 EMERGENCY DEPT VISIT SF MDM: CPT

## 2025-06-29 RX ORDER — CEPHALEXIN 500 MG/1
500 CAPSULE ORAL EVERY 6 HOURS SCHEDULED
Qty: 28 CAPSULE | Refills: 0 | Status: SHIPPED | OUTPATIENT
Start: 2025-06-29 | End: 2025-06-30

## 2025-06-29 RX ORDER — CEPHALEXIN 500 MG/1
500 CAPSULE ORAL ONCE
Status: COMPLETED | OUTPATIENT
Start: 2025-06-29 | End: 2025-06-29

## 2025-06-29 RX ADMIN — CEPHALEXIN 500 MG: 500 CAPSULE ORAL at 14:38

## 2025-06-29 NOTE — DISCHARGE INSTRUCTIONS
Patient Education     Cellulitis (skin infection) in adults - Discharge instructions   The Basics   Written by the doctors and editors at [facility]   What are discharge instructions? -- Discharge instructions are information about how to take care of yourself after getting medical care for a health problem.  What is cellulitis? -- Cellulitis is a skin infection (figure 1). It can happen when germs get into the skin. Normally, different types of germs live on a person's skin. Most of the time, these germs do not cause any problems. But if a person gets a cut or a break in the skin, the germs can get into the skin and cause an infection.  You need antibiotics to treat cellulitis. Usually, this involves taking antibiotic pills. Just putting antibiotic ointment on the skin does not work. It is important to take all of your antibiotics even if you start to feel better.  How do I care for myself at home? -- Ask the doctor or nurse what you should do when you go home. Make sure that you understand exactly what you need to do to care for yourself. Ask questions if there is anything you do not understand.  You should also:   Prop your painful body part on pillows, keeping it above the level of your heart. This might help lessen pain and swelling.   Keep the infected area clean and dry. Do not squeeze, scratch, or rub it. You can gently wash the area with soap and water or take a shower. Pat the area dry with a clean towel.   Wash your hands before and after you touch the infected area.  When should I call the doctor? -- Call for advice if:   You have a fever of 101°F (38.4°C) or higher, or chills.   The area becomes more red, swollen, or painful, or the redness or swelling spreads up your leg or arm or to a larger area.   The infected area is not better after 3 days of taking antibiotics.  All topics are updated as new evidence becomes available and our peer review process is complete.  This topic retrieved from [facility] on:  Mar 06, 2024.  Topic 705527 Version 1.0  Release: 32.2.4 - C32.64  © 2024 UpToDate, Inc. and/or its affiliates. All rights reserved.  figure 1: Cellulitis     Cellulitis is an infection of the skin. These infections can cause redness, pain, and/or swelling.  Graphic 537495 Version 1.0  Consumer Information Use and Disclaimer   Disclaimer: This generalized information is a limited summary of diagnosis, treatment, and/or medication information. It is not meant to be comprehensive and should be used as a tool to help the user understand and/or assess potential diagnostic and treatment options. It does NOT include all information about conditions, treatments, medications, side effects, or risks that may apply to a specific patient. It is not intended to be medical advice or a substitute for the medical advice, diagnosis, or treatment of a health care provider based on the health care provider's examination and assessment of a patient's specific and unique circumstances. Patients must speak with a health care provider for complete information about their health, medical questions, and treatment options, including any risks or benefits regarding use of medications. This information does not endorse any treatments or medications as safe, effective, or approved for treating a specific patient. UpToDate, Inc. and its affiliates disclaim any warranty or liability relating to this information or the use thereof.The use of this information is governed by the Terms of Use, available at https://www.woltersRoomClipuwer.com/en/know/clinical-effectiveness-terms. 2024© UpToDate, Inc. and its affiliates and/or licensors. All rights reserved.  Copyright   © 2024 UpToDate, Inc. and/or its affiliates. All rights reserved.

## 2025-06-29 NOTE — ED PROVIDER NOTES
Time reflects when diagnosis was documented in both MDM as applicable and the Disposition within this note       Time User Action Codes Description Comment    6/29/2025  2:39 PM Deshaun Andino Add [L03.115] Cellulitis of right knee           ED Disposition       ED Disposition   Discharge    Condition   Stable    Date/Time   Sun Jun 29, 2025  2:44 PM    Comment   Luis Stinson discharge to home/self care.                   Assessment & Plan       Medical Decision Making  Patient is a 39-year-old male with no significant past medical or surgical history that presents to the emergency department with redness and swelling to right lower leg for approximate 3 days.    Patient hemodynamically stable and afebrile  No sirs    Right knee with no pain out of proportion to PE  No hx of mrsa or superinfection  Pt denied offered tetnus  Delivered keflex in the emergency department  Less likely fungal infection; left knee scab with surrounding erythema and tenderness/heat/induration with no fluctuance- likely cellulitis    Prescribed keflex and counseled patient medication administration and side effects  Follow-up with PCP  Follow up with emergency department if symptoms persist or exacerbate  Patient demonstrates verbal understanding of all clinical laboratory and imaging findings, discharge instructions, follow-up, and verbally agrees with current treatment plan with teach back    *Due to voice recognition software, sound alike and misspelled words may be contained in the documentation*      Risk  Prescription drug management.             Medications   cephalexin (KEFLEX) capsule 500 mg (500 mg Oral Given 6/29/25 1438)       ED Risk Strat Scores                    No data recorded        SBIRT 22yo+      Flowsheet Row Most Recent Value   Initial Alcohol Screen: US AUDIT-C     1. How often do you have a drink containing alcohol? 0 Filed at: 06/29/2025 1341   2. How many drinks containing alcohol do you have on a typical  day you are drinking?  0 Filed at: 06/29/2025 1341   3a. Male UNDER 65: How often do you have five or more drinks on one occasion? 0 Filed at: 06/29/2025 1341   Audit-C Score 0 Filed at: 06/29/2025 1341   GRICELDA: How many times in the past year have you...    Used an illegal drug or used a prescription medication for non-medical reasons? Never Filed at: 06/29/2025 1341                            History of Present Illness       Chief Complaint   Patient presents with    Skin Problem     Concerned that he has a skin infection right knee region and anterior aspect right lower leg. States has mat contact from [employer] and [employer].      Patient is a 39-year-old male with no significant past medical or surgical history that presents to the emergency department with redness and swelling to right lower leg for approximate 3 days.  Patient denies associated symptoms.  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  Patient denies history of superinfections.  Patient denies palliative factors with provocative factors or pressure to right knee.  Patient denies noneffective treatment.  Patient denies fevers, chills, numbness, tingling and loss of power in any or all extremities.  Patient denies recent fall and recent trauma.  Patient denies immunocompromise.  Patient denies any other symptoms or concerns at this time.    Past Medical History[1]   Past Surgical History[2]   Family History[3]   Social History[4]   E-Cigarette/Vaping    E-Cigarette Use Never User       E-Cigarette/Vaping Substances    Nicotine No     THC No     CBD No     Flavoring No     Other No     Unknown No       I have reviewed and agree with the history as documented.       History provided by:  Patient   used: No        Review of Systems   Constitutional:  Negative for activity change,  appetite change, chills and fever.   HENT:  Negative for congestion, ear pain, postnasal drip, rhinorrhea, sinus pressure, sinus pain, sore throat and tinnitus.    Eyes:  Negative for photophobia, pain and visual disturbance.   Respiratory:  Negative for cough, chest tightness and shortness of breath.    Cardiovascular:  Negative for chest pain and palpitations.   Gastrointestinal:  Negative for abdominal pain, constipation, diarrhea, nausea and vomiting.   Genitourinary:  Negative for difficulty urinating, dysuria, flank pain, frequency, hematuria and urgency.   Musculoskeletal:  Negative for arthralgias, back pain, gait problem, neck pain and neck stiffness.   Skin:  Positive for rash. Negative for color change and pallor.   Allergic/Immunologic: Negative for environmental allergies and food allergies.   Neurological:  Negative for dizziness, seizures, syncope, weakness, numbness and headaches.   Psychiatric/Behavioral:  Negative for confusion.    All other systems reviewed and are negative.          Objective       ED Triage Vitals [06/29/25 1338]   Temperature Pulse Blood Pressure Respirations SpO2 Patient Position - Orthostatic VS   98.4 °F (36.9 °C) 75 143/78 20 97 % Sitting      Temp Source Heart Rate Source BP Location FiO2 (%) Pain Score    Tympanic Monitor Left arm -- 2      Vitals      Date and Time Temp Pulse SpO2 Resp BP Pain Score FACES Pain Rating User   06/29/25 1338 98.4 °F (36.9 °C) 75 97 % 20 143/78 2 -- SW            Physical Exam  Vitals and nursing note reviewed.   Constitutional:       General: He is awake.      Appearance: Normal appearance. He is well-developed and normal weight. He is not ill-appearing, toxic-appearing or diaphoretic.      Comments: /78 (BP Location: Left arm)   Pulse 75   Temp 98.4 °F (36.9 °C) (Tympanic)   Resp 20   Wt 114 kg (251 lb 3.2 oz)   SpO2 97%   BMI 35.53 kg/m²      HENT:      Head: Normocephalic and atraumatic.      Jaw: There is normal jaw  occlusion.      Right Ear: Hearing and external ear normal. No decreased hearing noted. No drainage, swelling or tenderness. No mastoid tenderness.      Left Ear: Hearing and external ear normal. No decreased hearing noted. No drainage, swelling or tenderness. No mastoid tenderness.      Nose: Nose normal.      Mouth/Throat:      Lips: Pink.      Mouth: Mucous membranes are moist.      Pharynx: Oropharynx is clear. Uvula midline.     Eyes:      General: Lids are normal. Vision grossly intact. Gaze aligned appropriately.         Right eye: No discharge.         Left eye: No discharge.      Extraocular Movements: Extraocular movements intact.      Conjunctiva/sclera: Conjunctivae normal.      Pupils: Pupils are equal, round, and reactive to light.     Neck:      Vascular: No JVD.      Trachea: Trachea and phonation normal. No tracheal tenderness or tracheal deviation.     Cardiovascular:      Rate and Rhythm: Normal rate and regular rhythm.      Pulses: Normal pulses.           Radial pulses are 2+ on the right side and 2+ on the left side.        Posterior tibial pulses are 2+ on the right side and 2+ on the left side.      Heart sounds: Normal heart sounds.   Pulmonary:      Effort: Pulmonary effort is normal.      Breath sounds: Normal breath sounds and air entry. No stridor. No decreased breath sounds, wheezing, rhonchi or rales.   Abdominal:      General: Bowel sounds are normal.      Palpations: Abdomen is not rigid.     Musculoskeletal:         General: Normal range of motion.      Cervical back: Full passive range of motion without pain, normal range of motion and neck supple. No rigidity. No spinous process tenderness or muscular tenderness. Normal range of motion.   Feet:      Right foot:      Toenail Condition: Right toenails are normal.      Left foot:      Toenail Condition: Left toenails are normal.   Lymphadenopathy:      Head:      Right side of head: No submental, submandibular, tonsillar,  preauricular, posterior auricular or occipital adenopathy.      Left side of head: No submental, submandibular, tonsillar, preauricular, posterior auricular or occipital adenopathy.      Cervical: No cervical adenopathy.      Right cervical: No superficial, deep or posterior cervical adenopathy.     Left cervical: No superficial, deep or posterior cervical adenopathy.     Skin:     General: Skin is warm.      Capillary Refill: Capillary refill takes less than 2 seconds.      Findings: Erythema present.          Neurological:      General: No focal deficit present.      Mental Status: He is alert and oriented to person, place, and time. Mental status is at baseline.      GCS: GCS eye subscore is 4. GCS verbal subscore is 5. GCS motor subscore is 6.      Sensory: No sensory deficit.      Deep Tendon Reflexes: Reflexes are normal and symmetric.      Reflex Scores:       Patellar reflexes are 2+ on the right side and 2+ on the left side.    Psychiatric:         Attention and Perception: Attention normal.         Mood and Affect: Mood normal.         Speech: Speech normal.         Behavior: Behavior normal. Behavior is cooperative.         Thought Content: Thought content normal.         Judgment: Judgment normal.             Results Reviewed       None            No orders to display       Procedures    ED Medication and Procedure Management   Prior to Admission Medications   Prescriptions Last Dose Informant Patient Reported? Taking?   Diclofenac Sodium (VOLTAREN) 1 %   No No   Sig: Apply 2 g topically 4 (four) times a day   Patient not taking: Reported on 5/15/2025   cyclobenzaprine (FLEXERIL) 10 mg tablet   No No   Sig: Take 1 tablet (10 mg total) by mouth daily at bedtime   Patient not taking: Reported on 5/15/2025   ibuprofen (MOTRIN) 400 mg tablet   Yes No   Sig: Take by mouth every 6 (six) hours as needed for mild pain   ofloxacin (OCUFLOX) 0.3 % ophthalmic solution   No No   Sig: Administer 1 drop to the right  eye 4 (four) times a day      Facility-Administered Medications: None     Discharge Medication List as of 6/29/2025  2:44 PM        START taking these medications    Details   cephalexin (KEFLEX) 500 mg capsule Take 1 capsule (500 mg total) by mouth every 6 (six) hours for 7 days, Starting Sun 6/29/2025, Until Sun 7/6/2025, Normal           CONTINUE these medications which have NOT CHANGED    Details   cyclobenzaprine (FLEXERIL) 10 mg tablet Take 1 tablet (10 mg total) by mouth daily at bedtime, Starting Mon 1/6/2025, Normal      Diclofenac Sodium (VOLTAREN) 1 % Apply 2 g topically 4 (four) times a day, Starting Mon 1/6/2025, Normal      ibuprofen (MOTRIN) 400 mg tablet Take by mouth every 6 (six) hours as needed for mild pain, Historical Med      ofloxacin (OCUFLOX) 0.3 % ophthalmic solution Administer 1 drop to the right eye 4 (four) times a day, Starting Thu 5/15/2025, Normal           No discharge procedures on file.  ED SEPSIS DOCUMENTATION   Time reflects when diagnosis was documented in both MDM as applicable and the Disposition within this note       Time User Action Codes Description Comment    6/29/2025  2:39 PM Deshaun Andino Add [L03.115] Cellulitis of right knee                      [1] No past medical history on file.  [2]   Past Surgical History:  Procedure Laterality Date    FL INJECTION RIGHT SHOULDER (ARTHROGRAM)  1/14/2025    TONSILLECTOMY     [3] No family history on file.  [4]   Social History  Tobacco Use    Smoking status: Former     Types: Cigarettes     Passive exposure: Past    Smokeless tobacco: Current     Types: Chew   Vaping Use    Vaping status: Never Used   Substance Use Topics    Alcohol use: No    Drug use: No        Deshaun Andino PA-C  06/29/25 2050

## 2025-06-30 ENCOUNTER — APPOINTMENT (INPATIENT)
Dept: RADIOLOGY | Facility: HOSPITAL | Age: 40
End: 2025-06-30
Payer: COMMERCIAL

## 2025-06-30 ENCOUNTER — APPOINTMENT (EMERGENCY)
Dept: RADIOLOGY | Facility: HOSPITAL | Age: 40
End: 2025-06-30
Payer: COMMERCIAL

## 2025-06-30 ENCOUNTER — HOSPITAL ENCOUNTER (INPATIENT)
Facility: HOSPITAL | Age: 40
LOS: 3 days | Discharge: HOME | End: 2025-07-03
Attending: EMERGENCY MEDICINE
Payer: COMMERCIAL

## 2025-06-30 DIAGNOSIS — L03.115 CELLULITIS OF RIGHT LOWER EXTREMITY: Primary | ICD-10-CM

## 2025-06-30 PROBLEM — E66.9 OBESITY (BMI 30-39.9): Status: ACTIVE | Noted: 2025-06-30

## 2025-06-30 PROBLEM — L03.90 CELLULITIS: Status: ACTIVE | Noted: 2025-06-30

## 2025-06-30 PROBLEM — M70.40 PREPATELLAR BURSITIS: Status: ACTIVE | Noted: 2025-06-30

## 2025-06-30 PROBLEM — D75.1 POLYCYTHEMIA: Status: ACTIVE | Noted: 2025-06-30

## 2025-06-30 PROBLEM — D72.829 LEUKOCYTOSIS: Status: ACTIVE | Noted: 2025-06-30

## 2025-06-30 LAB
ALBUMIN SERPL BCG-MCNC: 4.7 G/DL (ref 3.5–5)
ALP SERPL-CCNC: 67 U/L (ref 34–104)
ALT SERPL W P-5'-P-CCNC: 24 U/L (ref 7–52)
ANION GAP SERPL CALCULATED.3IONS-SCNC: 7 MMOL/L (ref 4–13)
APTT PPP: 28 SECONDS (ref 23–34)
AST SERPL W P-5'-P-CCNC: 15 U/L (ref 13–39)
BASOPHILS # BLD AUTO: 0.07 THOUSANDS/ÂΜL (ref 0–0.1)
BASOPHILS NFR BLD AUTO: 1 % (ref 0–1)
BILIRUB SERPL-MCNC: 1.3 MG/DL (ref 0.2–1)
BUN SERPL-MCNC: 15 MG/DL (ref 5–25)
CALCIUM SERPL-MCNC: 9.6 MG/DL (ref 8.4–10.2)
CHLORIDE SERPL-SCNC: 99 MMOL/L (ref 96–108)
CO2 SERPL-SCNC: 29 MMOL/L (ref 21–32)
CREAT SERPL-MCNC: 1.11 MG/DL (ref 0.6–1.3)
CRP SERPL QL: 44.7 MG/L
EOSINOPHIL # BLD AUTO: 0.05 THOUSAND/ÂΜL (ref 0–0.61)
EOSINOPHIL NFR BLD AUTO: 0 % (ref 0–6)
ERYTHROCYTE [DISTWIDTH] IN BLOOD BY AUTOMATED COUNT: 12.9 % (ref 11.6–15.1)
ERYTHROCYTE [SEDIMENTATION RATE] IN BLOOD: 19 MM/HOUR (ref 0–14)
GFR SERPL CREATININE-BSD FRML MDRD: 82 ML/MIN/1.73SQ M
GLUCOSE SERPL-MCNC: 97 MG/DL (ref 65–140)
HCT VFR BLD AUTO: 53 % (ref 36.5–49.3)
HGB BLD-MCNC: 18.3 G/DL (ref 12–17)
IMM GRANULOCYTES # BLD AUTO: 0.05 THOUSAND/UL (ref 0–0.2)
IMM GRANULOCYTES NFR BLD AUTO: 0 % (ref 0–2)
INR PPP: 0.99 (ref 0.85–1.19)
LACTATE SERPL-SCNC: 0.7 MMOL/L (ref 0.5–2)
LYMPHOCYTES # BLD AUTO: 2.04 THOUSANDS/ÂΜL (ref 0.6–4.47)
LYMPHOCYTES NFR BLD AUTO: 18 % (ref 14–44)
MCH RBC QN AUTO: 29 PG (ref 26.8–34.3)
MCHC RBC AUTO-ENTMCNC: 34.5 G/DL (ref 31.4–37.4)
MCV RBC AUTO: 84 FL (ref 82–98)
MONOCYTES # BLD AUTO: 1.08 THOUSAND/ÂΜL (ref 0.17–1.22)
MONOCYTES NFR BLD AUTO: 9 % (ref 4–12)
NEUTROPHILS # BLD AUTO: 8.21 THOUSANDS/ÂΜL (ref 1.85–7.62)
NEUTS SEG NFR BLD AUTO: 72 % (ref 43–75)
NRBC BLD AUTO-RTO: 0 /100 WBCS
PLATELET # BLD AUTO: 302 THOUSANDS/UL (ref 149–390)
PMV BLD AUTO: 10.3 FL (ref 8.9–12.7)
POTASSIUM SERPL-SCNC: 4.1 MMOL/L (ref 3.5–5.3)
PROCALCITONIN SERPL-MCNC: <0.05 NG/ML
PROT SERPL-MCNC: 7.7 G/DL (ref 6.4–8.4)
PROTHROMBIN TIME: 13.6 SECONDS (ref 12.3–15)
RBC # BLD AUTO: 6.3 MILLION/UL (ref 3.88–5.62)
SODIUM SERPL-SCNC: 135 MMOL/L (ref 135–147)
WBC # BLD AUTO: 11.5 THOUSAND/UL (ref 4.31–10.16)

## 2025-06-30 PROCEDURE — 87081 CULTURE SCREEN ONLY: CPT

## 2025-06-30 PROCEDURE — 85652 RBC SED RATE AUTOMATED: CPT | Performed by: EMERGENCY MEDICINE

## 2025-06-30 PROCEDURE — 73560 X-RAY EXAM OF KNEE 1 OR 2: CPT

## 2025-06-30 PROCEDURE — 93971 EXTREMITY STUDY: CPT

## 2025-06-30 PROCEDURE — 84145 PROCALCITONIN (PCT): CPT | Performed by: EMERGENCY MEDICINE

## 2025-06-30 PROCEDURE — 99222 1ST HOSP IP/OBS MODERATE 55: CPT | Performed by: ORTHOPAEDIC SURGERY

## 2025-06-30 PROCEDURE — 93971 EXTREMITY STUDY: CPT | Performed by: SURGERY

## 2025-06-30 PROCEDURE — 99284 EMERGENCY DEPT VISIT MOD MDM: CPT

## 2025-06-30 PROCEDURE — 85610 PROTHROMBIN TIME: CPT | Performed by: EMERGENCY MEDICINE

## 2025-06-30 PROCEDURE — 83605 ASSAY OF LACTIC ACID: CPT | Performed by: EMERGENCY MEDICINE

## 2025-06-30 PROCEDURE — 99284 EMERGENCY DEPT VISIT MOD MDM: CPT | Performed by: EMERGENCY MEDICINE

## 2025-06-30 PROCEDURE — 87040 BLOOD CULTURE FOR BACTERIA: CPT | Performed by: EMERGENCY MEDICINE

## 2025-06-30 PROCEDURE — 85730 THROMBOPLASTIN TIME PARTIAL: CPT | Performed by: EMERGENCY MEDICINE

## 2025-06-30 PROCEDURE — 80053 COMPREHEN METABOLIC PANEL: CPT | Performed by: EMERGENCY MEDICINE

## 2025-06-30 PROCEDURE — 99222 1ST HOSP IP/OBS MODERATE 55: CPT

## 2025-06-30 PROCEDURE — 85025 COMPLETE CBC W/AUTO DIFF WBC: CPT | Performed by: EMERGENCY MEDICINE

## 2025-06-30 PROCEDURE — 96367 TX/PROPH/DG ADDL SEQ IV INF: CPT

## 2025-06-30 PROCEDURE — 36415 COLL VENOUS BLD VENIPUNCTURE: CPT | Performed by: EMERGENCY MEDICINE

## 2025-06-30 PROCEDURE — 96365 THER/PROPH/DIAG IV INF INIT: CPT

## 2025-06-30 PROCEDURE — 86140 C-REACTIVE PROTEIN: CPT | Performed by: EMERGENCY MEDICINE

## 2025-06-30 RX ORDER — ACETAMINOPHEN 325 MG/1
650 TABLET ORAL EVERY 6 HOURS PRN
Status: DISCONTINUED | OUTPATIENT
Start: 2025-06-30 | End: 2025-07-03 | Stop reason: HOSPADM

## 2025-06-30 RX ORDER — SODIUM CHLORIDE 9 MG/ML
75 INJECTION, SOLUTION INTRAVENOUS CONTINUOUS
Status: DISCONTINUED | OUTPATIENT
Start: 2025-06-30 | End: 2025-07-01

## 2025-06-30 RX ORDER — CEFTRIAXONE 1 G/50ML
1000 INJECTION, SOLUTION INTRAVENOUS ONCE
Status: COMPLETED | OUTPATIENT
Start: 2025-06-30 | End: 2025-06-30

## 2025-06-30 RX ADMIN — CEFTRIAXONE 1000 MG: 1 INJECTION, SOLUTION INTRAVENOUS at 16:33

## 2025-06-30 RX ADMIN — VANCOMYCIN HYDROCHLORIDE 2000 MG: 10 INJECTION, POWDER, LYOPHILIZED, FOR SOLUTION INTRAVENOUS at 17:07

## 2025-06-30 RX ADMIN — SODIUM CHLORIDE 75 ML/HR: 0.9 INJECTION, SOLUTION INTRAVENOUS at 21:09

## 2025-06-30 RX ADMIN — ACETAMINOPHEN 650 MG: 325 TABLET, FILM COATED ORAL at 21:34

## 2025-06-30 NOTE — CONSULTS
Consultation - Orthopedics   Name: Luis Stinson 39 y.o. male I MRN: [id number]  Unit/Bed#: 2 [place] [address] A  Date of Admission: 6/30/2025   Date of Service: 6/30/2025 I Hospital Day: 0   Consult to orthopedics  Consult performed by: Andrea Ortiz MD  Consult ordered by: Crystal Villafuerte MD        Physician Requesting Evaluation: Brandon Quevedo, *   Reason for Evaluation / Principal Problem: Right leg pain and erythema    Assessment & Plan  Cellulitis    Polycythemia    Leukocytosis    Obesity (BMI 30-39.9)    Prepatellar bursitis  39-year-old male with just under week of pain, erythema and mild swelling to the prepatellar bursa and tibial tubercle.  After review of the history, physical exam findings he most likely has prepatellar bursitis and cellulitis.  No signs of intra-articular involvement today.  Plan to continue IV antibiotics for primary team.  No surgical intervention indicated at this point.  Plan to get baseline knee x-rays.  If he develops a fluid collection we will obtain an MRI versus CT scan.  Will continue to follow in house.      History of Present Illness   HPI: Luis Stinson is a 39 y.o. year old male who presents right upper shin pain for 5 to 6 days.  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  He had a small what he thought was an ingrown hair about a week ago.  He started develop erythema surrounding this.  He did get Keflex but the pain and swelling got worse.  No fevers or chills.    Review of Systems significant for findings described in the HPI.  Historical Information   Past Medical History[1]  Past Surgical History[2]  Social History[3]  E-Cigarette/Vaping    E-Cigarette Use Never User      E-Cigarette/Vaping Substances    Nicotine No     THC No     CBD No     Flavoring No     Other No     Unknown No      Family history non-contributory    Objective :  Temp:  [98.4 °F (36.9 °C)-99 °F (37.2 °C)]  98.4 °F (36.9 °C)  HR:  [75-83] 75  BP: (133-144)/(72-87) 142/87  Resp:  [18-20] 18  SpO2:  [95 %-97 %] 95 %  O2 Device: None (Room air)  Physical Exam  Constitutional:       General: He is not in acute distress.     Appearance: He is well-developed.   HENT:      Head: Normocephalic and atraumatic.     Eyes:      General: No scleral icterus.     Conjunctiva/sclera: Conjunctivae normal.     Neck:      Vascular: No JVD.     Cardiovascular:      Rate and Rhythm: Normal rate.   Pulmonary:      Effort: Pulmonary effort is normal. No respiratory distress.     Musculoskeletal:      Right knee: No effusion.      Instability Tests: Medial Tori test negative and lateral Tori test negative.      Comments: As per HPI     Skin:     General: Skin is warm.     Neurological:      Mental Status: He is alert and oriented to person, place, and time.      Coordination: Coordination normal.     Right Knee Exam     Tenderness   The patient is experiencing tenderness in the patellar tendon and patella.    Range of Motion   Extension:  0   Flexion:  90     Tests   Tori:  Medial - negative Lateral - negative  Lachman:  Anterior - negative      Drawer:  Anterior - negative    Posterior - negative  Patellar apprehension: negative    Other   Erythema: present  Scars: absent  Sensation: normal  Pulse: present  Swelling: mild  Effusion: no effusion present    Comments:  No knee effusion.  No pain on micromotion of the knee.  Small area of erythema about the distal patellar tendon and tibial tubercle.  No definitive fluid collection.  Tender to the touch.  No calf pain.  Compartments soft and compressible.  Fires EHL/FHL/tib ant/gastrocsoleus complex.  Sensation tact light touch in the S/S/SP/DP/T nerve distribution.  Warm well-perfused foot.                 Lab Results: I have reviewed the following results:   Recent Labs     06/30/25  1626   WBC 11.50*   HGB 18.3*   HCT 53.0*      BUN 15   CREATININE 1.11   PTT 28   INR 0.99  "  ESR 19*   CRP 44.7*     Blood Culture: No results found for: \"BLOODCX\"  Wound Culture: No results found for: \"WOUNDCULT\"    Imaging Results Review: No pertinent imaging studies reviewed. Knee xray pending  Other Study Results Review: No additional pertinent studies reviewed.       [1] No past medical history on file.  [2]   Past Surgical History:  Procedure Laterality Date    FL INJECTION RIGHT SHOULDER (ARTHROGRAM)  1/14/2025    TONSILLECTOMY     [3]   Social History  Tobacco Use    Smoking status: Former     Types: Cigarettes     Passive exposure: Past    Smokeless tobacco: Current     Types: Chew   Vaping Use    Vaping status: Never Used   Substance and Sexual Activity    Alcohol use: No    Drug use: No     "

## 2025-06-30 NOTE — H&P
H&P - Hospitalist   Name: Luis Stinson 39 y.o. male I MRN: [id number]  Unit/Bed#: ED 14 I Date of Admission: 6/30/2025   Date of Service: 6/30/2025 I Hospital Day: 0     Assessment & Plan  Cellulitis  RT LE rash , with pain distal to knee doesn't involve the RT foot.   Reports started 3 days ago ... Was seen in ED on 6/29 prescribed Keflex without improvement.     No recent trauma, falls, diving, hot tub usage or traveling. Reports practicing contact sports and is concerned that he picked an infection from the mat.       Reports no fever/chills  ROM at knee/ankle intact    ESR/CRP elevated     Orthopedics recommending admission for Abx     Plan:   Start IV vancomycin   Pharmacy consult   Interval follow up   Monitor vitals   Polycythemia  HgB: 18.3 on admission  No prior testing available      Check CBC in AM   Leukocytosis  11.8 on admission      Check CBC daily  Obesity (BMI 30-39.9)  Diet and exercise counseling and education       VTE Pharmacologic Prophylaxis:   Low Risk (Score 0-2) - Encourage Ambulation.  Code Status: No Order   Discussion with family: Patient declined call to .     Anticipated Length of Stay: Patient will be admitted on an inpatient basis with an anticipated length of stay of greater than 2 midnights secondary to cellulitis of RLE .    History of Present Illness   Chief Complaint: RT leg swelling and erythema      Luis Stinson is a 39 y.o. male with a PMH of obesity  who presents with RT lower extremity swelling and redness.started 3 days ago. Progressing erythema ( in size) since onset. Involves the area below knee and distally ends at ankle joint above the malleolas.   Reports intermittent sharp pain where the rash is  , doesn't radiate. Report intact ROM in the RT knee , ankle , hip. No fever ,chills.    No recent trauma, falls, diving, hot tub usage or traveling. Reports practicing contact sports and is concerned that he picked an infection from the mat.     No  history of similar problem     Review of Systems   Constitutional: Negative.  Negative for chills, fatigue and fever.   HENT: Negative.  Negative for hearing loss.    Eyes: Negative.  Negative for visual disturbance.   Respiratory:  Negative for shortness of breath and wheezing.    Cardiovascular:  Negative for chest pain and palpitations.   Gastrointestinal:  Negative for abdominal pain, blood in stool, constipation, diarrhea, nausea and vomiting.   Endocrine: Negative.    Genitourinary:  Negative for difficulty urinating and dysuria.   Musculoskeletal:  Negative for arthralgias and myalgias.   Skin:  Positive for rash.   Allergic/Immunologic: Negative.    Neurological:  Negative for seizures and syncope.   Hematological:  Negative for adenopathy.   Psychiatric/Behavioral: Negative.         Historical Information   Past Medical History[1]  Past Surgical History[2]  Social History[3]  E-Cigarette/Vaping    E-Cigarette Use Never User      E-Cigarette/Vaping Substances    Nicotine No     THC No     CBD No     Flavoring No     Other No     Unknown No      Family history non-contributory  Social History:  Marital Status: /Civil Union   Occupation: stable   Patient Pre-hospital Living Situation: Home  Patient Pre-hospital Level of Mobility: walks  Patient Pre-hospital Diet Restrictions: regular    Meds/Allergies   I have reviewed home medications with patient personally.  Prior to Admission medications    Medication Sig Start Date End Date Taking? Authorizing Provider   cephalexin (KEFLEX) 500 mg capsule Take 1 capsule (500 mg total) by mouth every 6 (six) hours for 7 days 6/29/25 6/30/25 Yes Deshaun Andino PA-C   cyclobenzaprine (FLEXERIL) 10 mg tablet Take 1 tablet (10 mg total) by mouth daily at bedtime  Patient not taking: Reported on 5/15/2025 1/6/25 6/30/25  BRANDYN Geller   Diclofenac Sodium (VOLTAREN) 1 % Apply 2 g topically 4 (four) times a day  Patient not taking: Reported on 5/15/2025 1/6/25  6/30/25  BRANDYN Geller   ibuprofen (MOTRIN) 400 mg tablet Take by mouth every 6 (six) hours as needed for mild pain  6/30/25  Historical Provider, MD   ofloxacin (OCUFLOX) 0.3 % ophthalmic solution Administer 1 drop to the right eye 4 (four) times a day 5/15/25 6/30/25  Aubrie Jones PA-C     Allergies   Allergen Reactions    Sulfa Antibiotics Lip Swelling       Objective :  Temp:  [98.4 °F (36.9 °C)-99 °F (37.2 °C)] 98.4 °F (36.9 °C)  HR:  [75-83] 75  BP: (133-144)/(72-80) 133/72  Resp:  [18-20] 18  SpO2:  [95 %-97 %] 95 %  O2 Device: None (Room air)    Physical Exam  Vitals and nursing note reviewed.   Constitutional:       General: He is not in acute distress.     Appearance: Normal appearance.   HENT:      Head: Normocephalic and atraumatic.      Mouth/Throat:      Mouth: Mucous membranes are moist.     Eyes:      Conjunctiva/sclera: Conjunctivae normal.      Pupils: Pupils are equal, round, and reactive to light.       Cardiovascular:      Rate and Rhythm: Normal rate and regular rhythm.      Pulses: Normal pulses.           Carotid pulses are 2+ on the right side and 2+ on the left side.       Radial pulses are 2+ on the right side and 2+ on the left side.        Dorsalis pedis pulses are 2+ on the right side and 2+ on the left side.      Heart sounds: Normal heart sounds, S1 normal and S2 normal. No murmur heard.  Pulmonary:      Effort: No tachypnea, bradypnea or accessory muscle usage.      Breath sounds: Normal breath sounds and air entry. No decreased breath sounds, wheezing, rhonchi or rales.   Abdominal:      General: Abdomen is flat. There is no distension.      Palpations: Abdomen is soft.      Tenderness: There is no abdominal tenderness.     Musculoskeletal:      Right lower leg: No edema.      Left lower leg: No edema.      Comments: ROM intact on RT ankle/hip/knee     Skin:          Comments: Erythema noted, poorly demarcated. Tenderness to palpation.   Doesn't involve knee or ankle  "area .   No drainage/discharge noted.     Neurological:      Mental Status: He is alert and oriented to person, place, and time. Mental status is at baseline.     Psychiatric:         Mood and Affect: Mood normal.         Behavior: Behavior normal.          Lines/Drains:            Lab Results: I have reviewed the following results:  Results from last 7 days   Lab Units 06/30/25  1626   WBC Thousand/uL 11.50*   HEMOGLOBIN g/dL 18.3*   HEMATOCRIT % 53.0*   PLATELETS Thousands/uL 302   SEGS PCT % 72   LYMPHO PCT % 18   MONO PCT % 9   EOS PCT % 0     Results from last 7 days   Lab Units 06/30/25  1626   SODIUM mmol/L 135   POTASSIUM mmol/L 4.1   CHLORIDE mmol/L 99   CO2 mmol/L 29   BUN mg/dL 15   CREATININE mg/dL 1.11   ANION GAP mmol/L 7   CALCIUM mg/dL 9.6   ALBUMIN g/dL 4.7   TOTAL BILIRUBIN mg/dL 1.30*   ALK PHOS U/L 67   ALT U/L 24   AST U/L 15   GLUCOSE RANDOM mg/dL 97     Results from last 7 days   Lab Units 06/30/25  1626   INR  0.99         No results found for: \"HGBA1C\"  Results from last 7 days   Lab Units 06/30/25  1626   LACTIC ACID mmol/L 0.7   PROCALCITONIN ng/ml <0.05       Imaging Results Review: No pertinent imaging studies reviewed.  Other Study Results Review: No additional pertinent studies reviewed.    Administrative Statements   I have spent a total time of 45 minutes in caring for this patient on the day of the visit/encounter including Diagnostic results, Prognosis, and Risks and benefits of tx options.    ** Please Note: This note has been constructed using a voice recognition system. **         [1] No past medical history on file.  [2]   Past Surgical History:  Procedure Laterality Date    FL INJECTION RIGHT SHOULDER (ARTHROGRAM)  1/14/2025    TONSILLECTOMY     [3]   Social History  Tobacco Use    Smoking status: Former     Types: Cigarettes     Passive exposure: Past    Smokeless tobacco: Current     Types: Chew   Vaping Use    Vaping status: Never Used   Substance and Sexual Activity    " Alcohol use: No    Drug use: No

## 2025-06-30 NOTE — ASSESSMENT & PLAN NOTE
39-year-old male with just under week of pain, erythema and mild swelling to the prepatellar bursa and tibial tubercle.  After review of the history, physical exam findings he most likely has prepatellar bursitis and cellulitis.  No signs of intra-articular involvement today.  Plan to continue IV antibiotics for primary team.  No surgical intervention indicated at this point.  Plan to get baseline knee x-rays.  If he develops a fluid collection we will obtain an MRI versus CT scan.  Will continue to follow in house.

## 2025-06-30 NOTE — ED PROVIDER NOTES
Time reflects when diagnosis was documented in both MDM as applicable and the Disposition within this note       Time User Action Codes Description Comment    6/30/2025  5:31 PM Crystal Villafuerte Add [L03.115] Cellulitis of right lower extremity           ED Disposition       ED Disposition   Admit    Condition   Stable    Date/Time   Mon Jun 30, 2025  5:30 PM    Comment   Case was discussed with JUNE, orthopedist .               Assessment & Plan       Medical Decision Making  Differential includes but not limited to cellulitis, joint infection, DVT, abscess.  Will check screening labs, cultures and give IV abx.  Will do vascular study to r/o DVT and possible CT scan.  Picture from yesterday's visit reviewed and leg looks much worse.  1730 - WBC 11.5, sed rate 19, CRP 44, lactic ok.  Discussed case with ortho who saw pt. And agreed he didn't think there was joint effusion or joint infection.  Will admit for cellulitis.  Discussed with SLIM.    Amount and/or Complexity of Data Reviewed  Labs: ordered.    Risk  Prescription drug management.  Decision regarding hospitalization.             Medications   vancomycin (VANCOCIN) 2,000 mg in sodium chloride 0.9 % 500 mL IVPB (2,000 mg Intravenous New Bag 6/30/25 1707)   cefTRIAXone (ROCEPHIN) IVPB (premix in dextrose) 1,000 mg 50 mL (0 mg Intravenous Stopped 6/30/25 1703)       ED Risk Strat Scores                    No data recorded                            History of Present Illness       Chief Complaint   Patient presents with    Leg Pain     Here yesterday for pain in leg and dx with cellulitis. Given abx. States feels much worse now       Past Medical History[1]   Past Surgical History[2]   Family History[3]   Social History[4]   E-Cigarette/Vaping    E-Cigarette Use Never User       E-Cigarette/Vaping Substances    Nicotine No     THC No     CBD No     Flavoring No     Other No     Unknown No       I have reviewed and agree with the history as documented.     40 yo  male developed pimple on right upper shin about 5-6 days.  He thought it was an ingrown hair.  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  It was initially red around it but then the redness started to spread and the pain got worse.  He was seen here yesterday and placed on Keflex which he has been taking.  He rested today but noted the redness and swelling got significantly worse.  He can't bear weight on the leg due to pain.  No documented fever but he felt warm at home.  No cough, vomiting, or other associated symptoms.  He is not a diabetic.      History provided by:  Patient   used: No    Leg Pain  Associated symptoms: no fever        Review of Systems   Constitutional:  Negative for chills and fever.   Respiratory:  Negative for cough.    Cardiovascular:  Positive for leg swelling.   Gastrointestinal:  Negative for diarrhea and vomiting.   Skin:  Positive for color change, rash and wound.           Objective       ED Triage Vitals [06/30/25 1559]   Temperature Pulse Blood Pressure Respirations SpO2 Patient Position - Orthostatic VS   99 °F (37.2 °C) 83 144/80 20 97 % Sitting      Temp Source Heart Rate Source BP Location FiO2 (%) Pain Score    Tympanic Monitor Right arm -- 5      Vitals      Date and Time Temp Pulse SpO2 Resp BP Pain Score FACES Pain Rating User   06/30/25 1559 99 °F (37.2 °C) 83 97 % 20 144/80 5 -- LS            Physical Exam  Vitals and nursing note reviewed.   Constitutional:       General: He is not in acute distress.     Appearance: He is not ill-appearing.     Cardiovascular:      Rate and Rhythm: Normal rate and regular rhythm.      Heart sounds: Normal heart sounds.   Pulmonary:      Effort: Pulmonary effort is normal. No respiratory distress.      Breath sounds: Normal breath sounds.     Musculoskeletal:         General: Swelling and tenderness present.      Cervical back: Normal range of motion and neck supple.      Comments: He can flex and extend at knee but with  pain.  There is tenderness anteriorly around patella.  Doesn't seem to be a intraarticular effusion     Skin:     General: Skin is warm and dry.      Findings: Erythema present.      Comments: Right leg small dark wound upper shin just below patella, entire lower leg from knee down to ankle red, warm, swollen and tender.       Neurological:      General: No focal deficit present.      Mental Status: He is alert and oriented to person, place, and time.     Psychiatric:         Mood and Affect: Mood normal.         Behavior: Behavior normal.         Results Reviewed       Procedure Component Value Units Date/Time    Procalcitonin [[id number]]  (Normal) Collected: 06/30/25 1626    Lab Status: Final result Specimen: Blood from Arm, Left Updated: 06/30/25 1717     Procalcitonin <0.05 ng/ml     Lactic acid [[id number]]  (Normal) Collected: 06/30/25 1626    Lab Status: Final result Specimen: Blood from Arm, Left Updated: 06/30/25 1657     LACTIC ACID 0.7 mmol/L     Narrative:      Result may be elevated if tourniquet was used during collection.    Comprehensive metabolic panel [[id number]]  (Abnormal) Collected: 06/30/25 1626    Lab Status: Final result Specimen: Blood from Arm, Left Updated: 06/30/25 1656     Sodium 135 mmol/L      Potassium 4.1 mmol/L      Chloride 99 mmol/L      CO2 29 mmol/L      ANION GAP 7 mmol/L      BUN 15 mg/dL      Creatinine 1.11 mg/dL      Glucose 97 mg/dL      Calcium 9.6 mg/dL      AST 15 U/L      ALT 24 U/L      Alkaline Phosphatase 67 U/L      Total Protein 7.7 g/dL      Albumin 4.7 g/dL      Total Bilirubin 1.30 mg/dL      eGFR 82 ml/min/1.73sq m     Narrative:      National Kid[employer] Foundation guidelines for Chronic Kidney Disease (CKD):     Stage 1 with normal or high GFR (GFR > 90 mL/min/1.73 square meters)    Stage 2 Mild CKD (GFR = 60-89 mL/min/1.73 square meters)    Stage 3A Moderate CKD (GFR = 45-59 mL/min/1.73 square meters)    Stage 3B Moderate CKD (GFR = 30-44 mL/min/1.73  square meters)    Stage 4 Severe CKD (GFR = 15-29 mL/min/1.73 square meters)    Stage 5 End Stage CKD (GFR <15 mL/min/1.73 square meters)  Note: GFR calculation is accurate only with a steady state creatinine    C-reactive protein [[id number]]  (Abnormal) Collected: 06/30/25 1626    Lab Status: Final result Specimen: Blood from Arm, Left Updated: 06/30/25 1656     CRP 44.7 mg/L     Protime-INR [[id number]]  (Normal) Collected: 06/30/25 1626    Lab Status: Final result Specimen: Blood from Arm, Left Updated: 06/30/25 1655     Protime 13.6 seconds      INR 0.99    Narrative:      INR Therapeutic Range    Indication                                             INR Range      Atrial Fibrillation                                               2.0-3.0  Hypercoagulable State                                    2.0.2.3  Left Ventricular Asist Device                            2.0-3.0  Mechanical Heart Valve                                  -    Aortic(with afib, MI, embolism, HF, LA enlargement,    and/or coagulopathy)                                     2.0-3.0 (2.5-3.5)     Mitral                                                             2.5-3.5  Prosthetic/Bioprosthetic Heart Valve               2.0-3.0  Venous thromboembolism (VTE: VT, PE        2.0-3.0    APTT [[id number]]  (Normal) Collected: 06/30/25 1626    Lab Status: Final result Specimen: Blood from Arm, Left Updated: 06/30/25 1655     PTT 28 seconds     Sedimentation rate, automated [[id number]]  (Abnormal) Collected: 06/30/25 1626    Lab Status: Final result Specimen: Blood from Arm, Left Updated: 06/30/25 1653     Sed Rate 19 mm/hour     CBC and differential [[id number]]  (Abnormal) Collected: 06/30/25 1626    Lab Status: Final result Specimen: Blood from Arm, Left Updated: 06/30/25 1641     WBC 11.50 Thousand/uL      RBC 6.30 Million/uL      Hemoglobin 18.3 g/dL      Hematocrit 53.0 %      MCV 84 fL      MCH 29.0 pg      MCHC 34.5 g/dL      RDW 12.9 %      MPV 10.3  fL      Platelets 302 Thousands/uL      nRBC 0 /100 WBCs      Segmented % 72 %      Immature Grans % 0 %      Lymphocytes % 18 %      Monocytes % 9 %      Eosinophils Relative 0 %      Basophils Relative 1 %      Absolute Neutrophils 8.21 Thousands/µL      Absolute Immature Grans 0.05 Thousand/uL      Absolute Lymphocytes 2.04 Thousands/µL      Absolute Monocytes 1.08 Thousand/µL      Eosinophils Absolute 0.05 Thousand/µL      Basophils Absolute 0.07 Thousands/µL     Blood culture #2 [[id number]] Collected: 06/30/25 1632    Lab Status: In process Specimen: Blood from Hand, Left Updated: 06/30/25 1636    Blood culture #1 [[id number]] Collected: 06/30/25 1626    Lab Status: In process Specimen: Blood from Arm, Left Updated: 06/30/25 1636            VAS lower limb venous duplex study, unilateral/limited    (Results Pending)       Procedures    ED Medication and Procedure Management   Prior to Admission Medications   Prescriptions Last Dose Informant Patient Reported? Taking?   cephalexin (KEFLEX) 500 mg capsule 6/30/2025 at 12:00 PM  No Yes   Sig: Take 1 capsule (500 mg total) by mouth every 6 (six) hours for 7 days      Facility-Administered Medications: None     Patient's Medications   Discharge Prescriptions    No medications on file     No discharge procedures on file.  ED SEPSIS DOCUMENTATION   Time reflects when diagnosis was documented in both MDM as applicable and the Disposition within this note       Time User Action Codes Description Comment    6/30/2025  5:31 PM Crystal Villafuerte Add [L03.115] Cellulitis of right lower extremity                      [1] No past medical history on file.  [2]   Past Surgical History:  Procedure Laterality Date    FL INJECTION RIGHT SHOULDER (ARTHROGRAM)  1/14/2025    TONSILLECTOMY     [3] No family history on file.  [4]   Social History  Tobacco Use    Smoking status: Former     Types: Cigarettes     Passive exposure: Past    Smokeless tobacco: Current     Types: Chew   Vaping  Use    Vaping status: Never Used   Substance Use Topics    Alcohol use: No    Drug use: No        Crystal Villafuerte MD  06/30/25 0308

## 2025-06-30 NOTE — ASSESSMENT & PLAN NOTE
RT LE rash , with pain distal to knee doesn't involve the RT foot.   Reports started 3 days ago ... Was seen in ED on 6/29 prescribed Keflex without improvement.     No recent trauma, falls, diving, hot tub usage or traveling. Reports practicing contact sports and is concerned that he picked an infection from the mat.       Reports no fever/chills  ROM at knee/ankle intact    ESR/CRP elevated     Orthopedics recommending admission for Abx     Plan:   Start IV vancomycin   Pharmacy consult   Interval follow up   Monitor vitals

## 2025-07-01 PROBLEM — D58.2 ELEVATED HEMOGLOBIN (HCC): Status: ACTIVE | Noted: 2025-06-30

## 2025-07-01 LAB
ANION GAP SERPL CALCULATED.3IONS-SCNC: 8 MMOL/L (ref 4–13)
BASOPHILS # BLD AUTO: 0.08 THOUSANDS/ÂΜL (ref 0–0.1)
BASOPHILS NFR BLD AUTO: 1 % (ref 0–1)
BUN SERPL-MCNC: 17 MG/DL (ref 5–25)
CALCIUM SERPL-MCNC: 8.5 MG/DL (ref 8.4–10.2)
CHLORIDE SERPL-SCNC: 99 MMOL/L (ref 96–108)
CO2 SERPL-SCNC: 28 MMOL/L (ref 21–32)
CREAT SERPL-MCNC: 1.06 MG/DL (ref 0.6–1.3)
EOSINOPHIL # BLD AUTO: 0.25 THOUSAND/ÂΜL (ref 0–0.61)
EOSINOPHIL NFR BLD AUTO: 3 % (ref 0–6)
ERYTHROCYTE [DISTWIDTH] IN BLOOD BY AUTOMATED COUNT: 12.6 % (ref 11.6–15.1)
GFR SERPL CREATININE-BSD FRML MDRD: 86 ML/MIN/1.73SQ M
GLUCOSE SERPL-MCNC: 134 MG/DL (ref 65–140)
HCT VFR BLD AUTO: 48.8 % (ref 36.5–49.3)
HGB BLD-MCNC: 16.1 G/DL (ref 12–17)
IMM GRANULOCYTES # BLD AUTO: 0.04 THOUSAND/UL (ref 0–0.2)
IMM GRANULOCYTES NFR BLD AUTO: 1 % (ref 0–2)
LYMPHOCYTES # BLD AUTO: 1.79 THOUSANDS/ÂΜL (ref 0.6–4.47)
LYMPHOCYTES NFR BLD AUTO: 21 % (ref 14–44)
MAGNESIUM SERPL-MCNC: 2 MG/DL (ref 1.9–2.7)
MCH RBC QN AUTO: 28 PG (ref 26.8–34.3)
MCHC RBC AUTO-ENTMCNC: 33 G/DL (ref 31.4–37.4)
MCV RBC AUTO: 85 FL (ref 82–98)
MONOCYTES # BLD AUTO: 1.06 THOUSAND/ÂΜL (ref 0.17–1.22)
MONOCYTES NFR BLD AUTO: 13 % (ref 4–12)
NEUTROPHILS # BLD AUTO: 5.28 THOUSANDS/ÂΜL (ref 1.85–7.62)
NEUTS SEG NFR BLD AUTO: 61 % (ref 43–75)
NRBC BLD AUTO-RTO: 0 /100 WBCS
PHOSPHATE SERPL-MCNC: 3.4 MG/DL (ref 2.7–4.5)
PLATELET # BLD AUTO: 273 THOUSANDS/UL (ref 149–390)
PMV BLD AUTO: 10.2 FL (ref 8.9–12.7)
POTASSIUM SERPL-SCNC: 3.9 MMOL/L (ref 3.5–5.3)
RBC # BLD AUTO: 5.75 MILLION/UL (ref 3.88–5.62)
SODIUM SERPL-SCNC: 135 MMOL/L (ref 135–147)
WBC # BLD AUTO: 8.5 THOUSAND/UL (ref 4.31–10.16)

## 2025-07-01 PROCEDURE — 80048 BASIC METABOLIC PNL TOTAL CA: CPT

## 2025-07-01 PROCEDURE — 83735 ASSAY OF MAGNESIUM: CPT

## 2025-07-01 PROCEDURE — 85025 COMPLETE CBC W/AUTO DIFF WBC: CPT

## 2025-07-01 PROCEDURE — 99232 SBSQ HOSP IP/OBS MODERATE 35: CPT | Performed by: INTERNAL MEDICINE

## 2025-07-01 PROCEDURE — 84100 ASSAY OF PHOSPHORUS: CPT

## 2025-07-01 RX ORDER — KETOROLAC TROMETHAMINE 30 MG/ML
15 INJECTION, SOLUTION INTRAMUSCULAR; INTRAVENOUS EVERY 6 HOURS SCHEDULED
Status: COMPLETED | OUTPATIENT
Start: 2025-07-01 | End: 2025-07-02

## 2025-07-01 RX ORDER — CEFAZOLIN SODIUM 2 G/50ML
2000 SOLUTION INTRAVENOUS EVERY 8 HOURS
Status: DISCONTINUED | OUTPATIENT
Start: 2025-07-01 | End: 2025-07-01

## 2025-07-01 RX ADMIN — KETOROLAC TROMETHAMINE 15 MG: 30 INJECTION, SOLUTION INTRAMUSCULAR; INTRAVENOUS at 18:04

## 2025-07-01 RX ADMIN — CEFAZOLIN SODIUM 2000 MG: 2 SOLUTION INTRAVENOUS at 14:44

## 2025-07-01 RX ADMIN — KETOROLAC TROMETHAMINE 15 MG: 30 INJECTION, SOLUTION INTRAMUSCULAR; INTRAVENOUS at 14:42

## 2025-07-01 RX ADMIN — VANCOMYCIN HYDROCHLORIDE 1250 MG: 10 INJECTION, POWDER, LYOPHILIZED, FOR SOLUTION INTRAVENOUS at 08:48

## 2025-07-01 RX ADMIN — KETOROLAC TROMETHAMINE 15 MG: 30 INJECTION, SOLUTION INTRAMUSCULAR; INTRAVENOUS at 23:14

## 2025-07-01 RX ADMIN — VANCOMYCIN HYDROCHLORIDE 1250 MG: 10 INJECTION, POWDER, LYOPHILIZED, FOR SOLUTION INTRAVENOUS at 20:48

## 2025-07-01 NOTE — UTILIZATION REVIEW
Initial Clinical Review    Admission: Date/Time/Statement:   Admission Orders (From admission, onward)       Ordered        06/30/25 1804  INPATIENT ADMISSION  Once                          Orders Placed This Encounter   Procedures    INPATIENT ADMISSION     Standing Status:   Standing     Number of Occurrences:   1     Level of Care:   Med Surg [16]     Estimated length of stay:   More than 2 Midnights     Certification:   I certify that inpatient services are medically necessary for this patient for a duration of greater than two midnights. See H&P and MD Progress Notes for additional information about the patient's course of treatment.     ED Arrival Information       Expected   -    Arrival   6/30/2025 15:51    Acuity   Urgent              Means of arrival   Walk-In    Escorted by   Self    Service   Hospitalist    Admission type   Emergency              Arrival complaint   leg pain / swelling             Chief Complaint   Patient presents with    Leg Pain     Here yesterday for pain in leg and dx with cellulitis. Given abx. States feels much worse now       Initial Presentation:   39 yom to ER from home for worsening pain & redness R upper shin. Reports started 5-6 days ago as pimple, redness spread, placed on Keflex after ER visit yesterday for same. No recent trauma, falls, diving, hot tub usage or traveling. Reports practicing contact sports and is concerned that he picked an infection from the mat. Now reports getting worse despite abt & unable to bear weight 2nd pain, reports warm at home, unknown fever. Hx obesity. Presents with temp 99. Exam: Right leg small dark wound upper shin just below patella, entire lower leg from knee down to ankle red, warm, swollen and tender, can flex and extend at knee but with pain. There is tenderness anteriorly around patella. Admission dopplers neg for DVT, xray pending. Labs: WBC 11.50, Hgb 18.3, tbili 1.30, CRP 44.7, sed rate 19.  Admitted to inpatient status for  cellulitis. Plan: IVABT, cultures.     Anticipated Length of Stay/Certification Statement:   Patient will be admitted on an inpatient basis with an anticipated length of stay of greater than 2 midnights secondary to cellulitis of RLE     Date: 7/1/25   Day 2:   Cellulitis POA. Pt reports worsening swelling, redness unchanged. May have started as prepatellar bursitis but does have swelling and mild erythema down to the ankle, no evidence of fluid collection or abscess. D/c IV vancomycin, start on IV cefazolin 2 g every 8 hours. Started on IV Toradol atc for pain mgt. Continue serial RLE exams, continue pain mgt, monitor VS, follow labs.    ED Treatment-Medication Administration from 06/30/2025 1551 to 06/30/2025 1848         Date/Time Order Dose Route Action     06/30/2025 1633 cefTRIAXone (ROCEPHIN) IVPB (premix in dextrose) 1,000 mg 50 mL 1,000 mg Intravenous New Bag     06/30/2025 1707 vancomycin (VANCOCIN) 2,000 mg in sodium chloride 0.9 % 500 mL IVPB 2,000 mg Intravenous New Bag            Scheduled Medications:  Medications 06/22 06/23 06/24 06/25 06/26 06/27 06/28 06/29 06/30 07/01   ceFAZolin (ANCEF) IVPB (premix in dextrose) 2,000 mg 50 mL  Dose: 2,000 mg  Freq: Every 8 hours Route: IV  Last Dose: 2,000 mg (07/01/25 1444)  Start: 07/01/25 1430   Admin Instructions:      Order specific questions:                1444     2230        cefTRIAXone (ROCEPHIN) IVPB (premix in dextrose) 1,000 mg 50 mL  Dose: 1,000 mg  Freq: Once Route: IV  Last Dose: Stopped (06/30/25 1703)  Start: 06/30/25 1630 End: 06/30/25 1703   Admin Instructions:      Order specific questions:               1633     1703         cephalexin (KEFLEX) capsule 500 mg  Dose: 500 mg  Freq: Once Route: PO  Start: 06/29/25 1445 End: 06/29/25 1438   Admin Instructions:      Order specific questions:              1438          ketorolac (TORADOL) injection 15 mg  Dose: 15 mg  Freq: Every 6 hours scheduled Route: IV  Start: 07/01/25 1245 End: 07/02/25  0559   Admin Instructions:                1442     1800        vancomycin (VANCOCIN) 1,250 mg in sodium chloride 0.9 % 250 mL IVPB  Dose: 1,250 mg  Freq: Every 12 hours Route: IV  Last Dose: 1,250 mg (07/01/25 0848)  Start: 07/01/25 0800 End: 07/01/25 1426   Admin Instructions:      Order specific questions:                0848     1426-D/C'd      vancomycin (VANCOCIN) 2,000 mg in sodium chloride 0.9 % 500 mL IVPB  Dose: 2,000 mg  Freq: Once Route: IV  Last Dose: 2,000 mg (06/30/25 1707)  Start: 06/30/25 1630 End: 06/30/25 1907   Admin Instructions:      Order specific questions:               1707         Legend:       [id number]/2206/2306/2406/2506/2606/2706/2806/2906/3007/01        Continuous Meds Sorted by Name  for Lusi Stinson Jr as of 06/22/25 through 7/1/25  Legend:       Medications 06/22 06/23 06/24 06/25 06/26 06/27 06/28 06/29 06/30 07/01   sodium chloride 0.9 % infusion  Rate: 75 mL/hr Dose: 75 mL/hr  Freq: Continuous Route: IV  Last Dose: Stopped (07/01/25 1306)  Start: 06/30/25 1830 End: 07/01/25 1241            2109      1241-D/C'd  1306 [C]                    PRN Meds Sorted by Name  for Luis Stinson Jr as of 06/22/25 through 7/1/25  Legend:       Medications 06/22 06/23 06/24 06/25 06/26 06/27 06/28 06/29 06/30 07/01   acetaminophen (TYLENOL) tablet 650 mg  Dose: 650 mg  Freq: Every 6 hours PRN Route: PO  PRN Reasons: mild pain,headaches  Start: 06/30/25 2119            2134                     ED Triage Vitals [06/30/25 1559]   Temperature Pulse Respirations Blood Pressure SpO2 Pain Score   99 °F (37.2 °C) 83 20 144/80 97 % 5     Weight (last 2 days)       Date/Time Weight    06/30/25 1758 114 (251)            Vital Signs (last 3 days)       Date/Time Temp Pulse Resp BP MAP (mmHg) SpO2 O2 Device Patient Position - Orthostatic VS [place] Coma Scale Score Pain    07/01/25 07:28:18 98.1 °F (36.7 °C) -- -- 134/82 99 -- -- -- -- --    07/01/25 03:32:13 -- -- 18 135/64 88 -- -- -- -- --     06/30/25 22:41:58 99.4 °F (37.4 °C) 75 20 138/81 100 94 % -- -- -- No Pain    06/30/25 2134 -- -- -- -- -- -- -- -- -- 3    06/30/25 2100 -- -- -- -- -- -- -- -- 15 --    06/30/25 20:53:22 99.4 °F (37.4 °C) 69 17 140/82 101 96 % None (Room air) -- -- --    06/30/25 18:54:12 -- -- -- 142/87 105 -- -- -- -- --    06/30/25 1758 98.4 °F (36.9 °C) 75 18 133/72 -- 95 % None (Room air) Lying -- --    06/30/25 1613 -- -- -- -- -- -- None (Room air) -- 15 --    06/30/25 1559 99 °F (37.2 °C) 83 20 144/80 -- 97 % None (Room air) Sitting -- 5              Pertinent Labs/Diagnostic Test Results:   Radiology:  VAS lower limb venous duplex study, unilateral/limited   Final Interpretation by Sharath Schmidt DO (06/30 1857)  RIGHT LOWER LIMB   There is no evidence of acute or chronic deep vein thrombosis.   There is no evidence of superficial thrombophlebitis.   Doppler evaluation shows a normal response to augmentation maneuvers.   Popliteal, posterior tibial and anterior tibial arterial Doppler waveforms are triphasic.       LEFT LOWER LIMB LIMITED   There is no evidence of thrombus in the common femoral vein.    Doppler evaluation shows a normal response to augmentation maneuvers.        XR knee 1 or 2 vw right    (Results Pending)     Cardiology:  No orders to display     GI:  No orders to display           Results from last 7 days   Lab Units 07/01/25  0650 06/30/25  1626   WBC Thousand/uL 8.50 11.50*   HEMOGLOBIN g/dL 16.1 18.3*   HEMATOCRIT % 48.8 53.0*   PLATELETS Thousands/uL 273 302   TOTAL NEUT ABS Thousands/µL 5.28 8.21*         Results from last 7 days   Lab Units 07/01/25  0650 06/30/25  1626   SODIUM mmol/L 135 135   POTASSIUM mmol/L 3.9 4.1   CHLORIDE mmol/L 99 99   CO2 mmol/L 28 29   ANION GAP mmol/L 8 7   BUN mg/dL 17 15   CREATININE mg/dL 1.06 1.11   EGFR ml/min/1.73sq m 86 82   CALCIUM mg/dL 8.5 9.6   MAGNESIUM mg/dL 2.0  --    PHOSPHORUS mg/dL 3.4  --      Results from last 7 days   Lab Units 06/30/25  7921  "  AST U/L 15   ALT U/L 24   ALK PHOS U/L 67   TOTAL PROTEIN g/dL 7.7   ALBUMIN g/dL 4.7   TOTAL BILIRUBIN mg/dL 1.30*         Results from last 7 days   Lab Units 07/01/25  0650 06/30/25  1626   GLUCOSE RANDOM mg/dL 134 97             No results found for: \"BETA-HYDROXYBUTYRATE\"                           Results from last 7 days   Lab Units 06/30/25  1626   PROTIME seconds 13.6   INR  0.99   PTT seconds 28         Results from last 7 days   Lab Units 06/30/25  1626   PROCALCITONIN ng/ml <0.05     Results from last 7 days   Lab Units 06/30/25  1626   LACTIC ACID mmol/L 0.7                                     Results from last 7 days   Lab Units 06/30/25  1626   CRP mg/L 44.7*   SED RATE mm/hour 19*                                             Results from last 7 days   Lab Units 06/30/25  1632 06/30/25  1626   BLOOD CULTURE  Received in Microbiology Lab. Culture in Progress. Received in Microbiology Lab. Culture in Progress.                   Past Medical History[1]  Present on Admission:  **None**      Admitting Diagnosis: Leg pain [M79.606]  Cellulitis of right lower extremity [L03.115]  Age/Sex: 39 y.o. male    Network Utilization Review Department  ATTENTION: Please call with any questions or concerns to [phone] and carefully listen to the prompts so that you are directed to the right person. All voicemails are confidential.   For Discharge needs, contact Care Management DC Support Team at [phone] opt. 2  Send all requests for admission clinical reviews, approved or denied determinations and any other requests to dedicated fax number below belonging to the campus where the patient is receiving treatment. List of dedicated fax numbers for the Facilities:  FACILITY NAME UR FAX NUMBER   ADMISSION DENIALS (Administrative/Medical Necessity) [phone]   DISCHARGE SUPPORT TEAM (NETWORK) [phone]   PARENT CHILD HEALTH (Maternity/NICU/Pediatrics) [phone]   [facility] " [phone]   General acute hospital [phone]   [facility] [phone]   [facility] [phone]   [facility] [phone]   [facility] [phone]   [facility] [phone]   [facility] [phone]   [facility] [phone]   Critical access hospital [phone]   [facility] [phone]   [facility] [phone]              [1] No past medical history on file.

## 2025-07-01 NOTE — PROGRESS NOTES
Luis Stinson is a 39 y.o. male who is currently ordered Vancomycin IV with management by the Pharmacy Consult service.  Relevant clinical data and objective / subjective history reviewed.  Vancomycin Assessment:  Indication and Goal AUC/Trough: Soft tissue (goal -600, trough >10)  Clinical Status: new  Micro:     Renal Function:  SCr: 1.11 mg/dL  CrCl: 111.6 mL/min  Renal replacement: Not on dialysis  Days of Therapy: 1  Current Dose: 2,000 mg x1 (loading dose)  Vancomycin Plan:  New Dosin,250 mg Q12H  Estimated AUC: 490 mcg*hr/mL  Estimated Trough: 14.4 mcg/mL  Next Level: 0600 on 25  Renal Function Monitoring: Daily BMP and UOP  Pharmacy will continue to follow closely for s/sx of nephrotoxicity, infusion reactions and appropriateness of therapy.  BMP and CBC will be ordered per protocol. We will continue to follow the patient’s culture results and clinical progress daily.    Mac Lopez, Pharmacist

## 2025-07-01 NOTE — ASSESSMENT & PLAN NOTE
Patient presented with right lower extremity erythema below the knee, with tenderness and pain on walking.  Patient thought initially started with what was ingrown hair in the prepatellar region.    No evidence of joint involvement or orthopedic surgery.  May have started as prepatellar bursitis but does have swelling and mild erythema down to the ankle.  However no evidence of fluid collection or abscess.  Can discontinue IV vancomycin  Start on IV cefazolin 2 g every 8 hours  Elevate leg  Toradol x 3 doses  Monitor fever curve and WBC count  Serial exams

## 2025-07-01 NOTE — QUICK NOTE
Patient reporting worsening erythema, pain, swelling of his RLE now extending up his thigh. Patient's vitals are stable. No palpable fluctuance on physical exam. 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 MRSA culture is pending. Will switch abx from Ancef to Vancomycin. Plan to monitor for improvement with abx overnight, if continues to worsen, plan to obtain CT RLE with contrast. Patient agreeable with plan.

## 2025-07-01 NOTE — CONSULTS
Vancomycin IV Pharmacy-to-Dose Consultation     Vancomycin has been discontinued.  Pharmacy will sign off.  Please contact or re-consult with questions.    Yamile Tabor, Pharmacist

## 2025-07-01 NOTE — PROGRESS NOTES
Progress Note - Hospitalist   Name: Luis Stinson 39 y.o. male I MRN: [id number]  Unit/Bed#: 2 [place] [address] A  Date of Admission: 6/30/2025   Date of Service: 7/1/2025 I Hospital Day: 1    Assessment & Plan  Cellulitis  Patient presented with right lower extremity erythema below the knee, with tenderness and pain on walking.  Patient thought initially started with what was ingrown hair in the prepatellar region.    No evidence of joint involvement or orthopedic surgery.  May have started as prepatellar bursitis but does have swelling and mild erythema down to the ankle.  However no evidence of fluid collection or abscess.  Can discontinue IV vancomycin  Start on IV cefazolin 2 g every 8 hours  Elevate leg  Toradol x 3 doses  Monitor fever curve and WBC count  Serial exams    Prepatellar bursitis  Appreciate orthopedic surgery evaluation; no evidence of joint involvement  Elevated hemoglobin (HCC)  Hemoglobin 18.3 on admission though corrected with IV fluid hydration  Obesity (BMI 30-39.9)  Diet and exercise counseling and education     VTE Pharmacologic Prophylaxis:   Low Risk (Score 0-2) - Encourage Ambulation.    Mobility:   Basic Mobility Inpatient Raw Score: 18  JH-HLM Goal: 6: Walk 10 steps or more  JH-HLM Achieved: 8: Walk 250 feet ot more  JH-HLM Goal achieved. Continue to encourage appropriate mobility.    Patient Centered Rounds: I performed bedside rounds with nursing staff today.   Discussions with Specialists or Other Care Team Provider: HUNG.     Education and Discussions with Family / Patient: Updated  (wife) at bedside.    Current Length of Stay: 1 day(s)  Current Patient Status: Inpatient   Certification Statement: The patient will continue to require additional inpatient hospital stay due to IV antibiotics, serial lab monitoring  Discharge Plan: Anticipate discharge in 24-48 hrs to home.    Code Status: Level 1 - Full Code    Subjective   Patient seen and examined.  Complaining of  some worsening in swelling of the leg today, also feels like the redness might be a bit worse or at least unchanged.  Afebrile overnight.  Pain mildly improved.    Objective :  Temp:  [98.1 °F (36.7 °C)-99.4 °F (37.4 °C)] 98.1 °F (36.7 °C)  HR:  [69-83] 75  BP: (133-144)/(64-87) 134/82  Resp:  [17-20] 18  SpO2:  [94 %-97 %] 94 %  O2 Device: None (Room air)    Body mass index is 34.04 kg/m².     Input and Output Summary (last 24 hours):     Intake/Output Summary (Last 24 hours) at 7/1/2025 1423  Last data filed at 7/1/2025 0600  Gross per 24 hour   Intake 713.75 ml   Output --   Net 713.75 ml     PHYSICAL EXAM:    Vitals signs reviewed  Constitutional   Awake and cooperative. NAD.   Head/Neck   Normocephalic. Atraumatic.   HEENT   No scleral icterus. EOMI.   Heart   Regular rate and rhythm. No murmurs.   Lungs   Clear to auscultation bilaterally. Respirations unlaboured.   Abdomen   Soft. Nontender. Nondistended.    Skin   Skin color normal. No rashes.   Extremities There is faint erythema from the right knee down to the right ankle.  Edema of the right lower extremity appreciated.  No open sores or areas of fluctuance, induration, or fluid collection.   Neuro   Alert and oriented. No new deficits.   Psych   Mood stable. Affect normal.                 Lab Results: I have reviewed the following results:   Results from last 7 days   Lab Units 07/01/25  0650   WBC Thousand/uL 8.50   HEMOGLOBIN g/dL 16.1   HEMATOCRIT % 48.8   PLATELETS Thousands/uL 273   SEGS PCT % 61   LYMPHO PCT % 21   MONO PCT % 13*   EOS PCT % 3     Results from last 7 days   Lab Units 07/01/25  0650 06/30/25  1626   SODIUM mmol/L 135 135   POTASSIUM mmol/L 3.9 4.1   CHLORIDE mmol/L 99 99   CO2 mmol/L 28 29   BUN mg/dL 17 15   CREATININE mg/dL 1.06 1.11   ANION GAP mmol/L 8 7   CALCIUM mg/dL 8.5 9.6   ALBUMIN g/dL  --  4.7   TOTAL BILIRUBIN mg/dL  --  1.30*   ALK PHOS U/L  --  67   ALT U/L  --  24   AST U/L  --  15   GLUCOSE RANDOM mg/dL 134 97      Results from last 7 days   Lab Units 06/30/25  1626   INR  0.99             Results from last 7 days   Lab Units 06/30/25  1626   LACTIC ACID mmol/L 0.7   PROCALCITONIN ng/ml <0.05       Recent Cultures (last 7 days):   Results from last 7 days   Lab Units 06/30/25  1632 06/30/25  1626   BLOOD CULTURE  Received in Microbiology Lab. Culture in Progress. Received in Microbiology Lab. Culture in Progress.       Imaging Results Review: No pertinent imaging studies reviewed.  Other Study Results Review: No additional pertinent studies reviewed.    Last 24 Hours Medication List:     Current Facility-Administered Medications:     acetaminophen (TYLENOL) tablet 650 mg, Q6H PRN    ketorolac (TORADOL) injection 15 mg, Q6H BARBARA    vancomycin (VANCOCIN) 1,250 mg in sodium chloride 0.9 % 250 mL IVPB, Q12H, Last Rate: 1,250 mg (07/01/25 0848)    Administrative Statements   Today, Patient Was Seen By: Brandon Quevedo, DO  I have spent a total time of 40 minutes in caring for this patient on the day of the visit/encounter including Diagnostic results, Prognosis, Risks and benefits of tx options, Instructions for management, Patient and family education, Importance of tx compliance, Risk factor reductions, Impressions, Counseling / Coordination of care, Documenting in the medical record, Reviewing/placing orders in the medical record (including tests, medications, and/or procedures), Obtaining or reviewing history  , and Communicating with other healthcare professionals .    **Please Note: This note may have been constructed using a voice recognition system.**

## 2025-07-01 NOTE — PROGRESS NOTES
Luis Stinson is a 39 y.o. male who is currently ordered Vancomycin IV with management by the Pharmacy Consult service.  Relevant clinical data and objective / subjective history reviewed.  Vancomycin Assessment:  Indication and Goal AUC/Trough: Soft tissue (goal -600, trough >10)  Clinical Status: stable  Micro:   PENDING  Renal Function:  SCr: 1.06 mg/dL  CrCl: 119.6 mL/min  Renal replacement: Not on dialysis  Days of Therapy: 2  Current Dose: 1250 mg Q12H  Vancomycin Plan:  New Dosing: Continue 1250 mg Q12H  Estimated AUC: 463 mcg*hr/mL  Estimated Trough: 13.3 mcg/mL  Next Level: 07/02/25 @0600  Renal Function Monitoring: Daily BMP and UOP  Pharmacy will continue to follow closely for s/sx of nephrotoxicity, infusion reactions and appropriateness of therapy.  BMP and CBC will be ordered per protocol. We will continue to follow the patient’s culture results and clinical progress daily.    Neris Sapp, Pharmacist

## 2025-07-02 LAB
ANION GAP SERPL CALCULATED.3IONS-SCNC: 7 MMOL/L (ref 4–13)
BUN SERPL-MCNC: 18 MG/DL (ref 5–25)
CALCIUM SERPL-MCNC: 9 MG/DL (ref 8.4–10.2)
CHLORIDE SERPL-SCNC: 104 MMOL/L (ref 96–108)
CO2 SERPL-SCNC: 28 MMOL/L (ref 21–32)
CREAT SERPL-MCNC: 0.99 MG/DL (ref 0.6–1.3)
ERYTHROCYTE [DISTWIDTH] IN BLOOD BY AUTOMATED COUNT: 12.4 % (ref 11.6–15.1)
GFR SERPL CREATININE-BSD FRML MDRD: 94 ML/MIN/1.73SQ M
GLUCOSE SERPL-MCNC: 111 MG/DL (ref 65–140)
HCT VFR BLD AUTO: 47.4 % (ref 36.5–49.3)
HGB BLD-MCNC: 15.7 G/DL (ref 12–17)
MCH RBC QN AUTO: 28 PG (ref 26.8–34.3)
MCHC RBC AUTO-ENTMCNC: 33.1 G/DL (ref 31.4–37.4)
MCV RBC AUTO: 85 FL (ref 82–98)
MRSA NOSE QL CULT: NORMAL
PLATELET # BLD AUTO: 260 THOUSANDS/UL (ref 149–390)
PMV BLD AUTO: 10.7 FL (ref 8.9–12.7)
POTASSIUM SERPL-SCNC: 3.8 MMOL/L (ref 3.5–5.3)
RBC # BLD AUTO: 5.6 MILLION/UL (ref 3.88–5.62)
SODIUM SERPL-SCNC: 139 MMOL/L (ref 135–147)
WBC # BLD AUTO: 6.98 THOUSAND/UL (ref 4.31–10.16)

## 2025-07-02 PROCEDURE — 85027 COMPLETE CBC AUTOMATED: CPT | Performed by: INTERNAL MEDICINE

## 2025-07-02 PROCEDURE — 80048 BASIC METABOLIC PNL TOTAL CA: CPT | Performed by: INTERNAL MEDICINE

## 2025-07-02 PROCEDURE — 99232 SBSQ HOSP IP/OBS MODERATE 35: CPT | Performed by: INTERNAL MEDICINE

## 2025-07-02 RX ADMIN — KETOROLAC TROMETHAMINE 15 MG: 30 INJECTION, SOLUTION INTRAMUSCULAR; INTRAVENOUS at 06:57

## 2025-07-02 RX ADMIN — VANCOMYCIN HYDROCHLORIDE 1250 MG: 10 INJECTION, POWDER, LYOPHILIZED, FOR SOLUTION INTRAVENOUS at 22:37

## 2025-07-02 RX ADMIN — KETOROLAC TROMETHAMINE 15 MG: 30 INJECTION, SOLUTION INTRAMUSCULAR; INTRAVENOUS at 12:55

## 2025-07-02 RX ADMIN — VANCOMYCIN HYDROCHLORIDE 1250 MG: 10 INJECTION, POWDER, LYOPHILIZED, FOR SOLUTION INTRAVENOUS at 09:10

## 2025-07-02 NOTE — UTILIZATION REVIEW
Continued Stay Review    Date: 7/2/25                          Current Patient Class: Inpatient    Current Level of Care: med surg  HPI:39 y.o. male initially admitted on 6/30/25     Current Diagnosis: cellulitis    Assessment/Plan:   Reported increased erythema, pain & swelling RLE now extending up his thigh. No palpable fluctuance on physical exam. 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 MRSA culture is pending. Will switch abx from Ancef to Vancomycin. Plan to monitor for improvement with abx overnight, if continues to worsen, plan to obtain CT RLE with contrast.  Using IV Toradol atc for pain control. Continue serial RLE exams, pain mgt, monitor VS, follow labs.    Medications:   Scheduled Medications:  ketorolac, 15 mg, Intravenous, Q6H BARBARA  vancomycin, 1,250 mg, Intravenous, Q12H    PRN Meds:  acetaminophen, 650 mg, Oral, Q6H PRN      Discharge Plan: tbd    Vital Signs (last 3 days)       Date/Time Temp Pulse Resp BP MAP (mmHg) SpO2 O2 Device Patient Position - Orthostatic VS Cece Coma Scale Score Pain    07/02/25 07:30:17 98.2 °F (36.8 °C) 69 18 147/86 106 96 % None (Room air) Lying -- --    07/02/25 0657 -- -- -- -- -- -- -- -- -- No Pain    07/01/25 23:18:59 98.4 °F (36.9 °C) 64 18 148/88 108 97 % None (Room air) Sitting -- --    07/01/25 2314 -- -- -- -- -- -- -- -- -- No Pain    07/01/25 2100 -- -- -- -- -- -- -- -- 15 No Pain    07/01/25 19:32:57 98.2 °F (36.8 °C) 68 18 143/84 104 93 % None (Room air) -- -- --    07/01/25 15:29:39 98.2 °F (36.8 °C) -- 17 135/83 100 -- -- -- -- --    07/01/25 1442 -- -- -- -- -- -- -- -- -- 5    07/01/25 1100 -- -- -- -- -- 94 % None (Room air) -- 15 No Pain    07/01/25 07:28:18 98.1 °F (36.7 °C) -- -- 134/82 99 -- -- -- -- --    07/01/25 03:32:13 -- -- 18 135/64 88 -- -- -- -- --    06/30/25 22:41:58 99.4 °F (37.4 °C) 75 20 138/81 100 94 % -- -- -- No Pain    06/30/25 2134 -- -- -- -- -- -- -- -- -- 3    06/30/25 2100 -- --  "-- -- -- -- -- -- 15 --    06/30/25 20:53:22 99.4 °F (37.4 °C) 69 17 140/82 101 96 % None (Room air) -- -- --    06/30/25 18:54:12 -- -- -- 142/87 105 -- -- -- -- --    06/30/25 1758 98.4 °F (36.9 °C) 75 18 133/72 -- 95 % None (Room air) Lying -- --    06/30/25 1613 -- -- -- -- -- -- None (Room air) -- 15 --    06/30/25 1559 99 °F (37.2 °C) 83 20 144/80 -- 97 % None (Room air) Sitting -- 5          Weight (last 2 days)       Date/Time Weight    06/30/25 1758 114 (251)            Pertinent Labs/Diagnostic Results:   Radiology:  XR knee 1 or 2 vw right   Final Interpretation by Brent Iraheta MD (07/01 1310)      Normal.         Workstation performed: [id number]         VAS lower limb venous duplex study, unilateral/limited   Final Interpretation by Sharath Schmidt DO (06/30 1857)        Cardiology:  No orders to display     GI:  No orders to display           Results from last 7 days   Lab Units 07/02/25  0500 07/01/25  0650 06/30/25  1626   WBC Thousand/uL 6.98 8.50 11.50*   HEMOGLOBIN g/dL 15.7 16.1 18.3*   HEMATOCRIT % 47.4 48.8 53.0*   PLATELETS Thousands/uL 260 273 302   TOTAL NEUT ABS Thousands/µL  --  5.28 8.21*         Results from last 7 days   Lab Units 07/02/25  0500 07/01/25  0650 06/30/25  1626   SODIUM mmol/L 139 135 135   POTASSIUM mmol/L 3.8 3.9 4.1   CHLORIDE mmol/L 104 99 99   CO2 mmol/L 28 28 29   ANION GAP mmol/L 7 8 7   BUN mg/dL 18 17 15   CREATININE mg/dL 0.99 1.06 1.11   EGFR ml/min/1.73sq m 94 86 82   CALCIUM mg/dL 9.0 8.5 9.6   MAGNESIUM mg/dL  --  2.0  --    PHOSPHORUS mg/dL  --  3.4  --      Results from last 7 days   Lab Units 06/30/25  1626   AST U/L 15   ALT U/L 24   ALK PHOS U/L 67   TOTAL PROTEIN g/dL 7.7   ALBUMIN g/dL 4.7   TOTAL BILIRUBIN mg/dL 1.30*         Results from last 7 days   Lab Units 07/02/25  0500 07/01/25  0650 06/30/25  1626   GLUCOSE RANDOM mg/dL 111 134 97             No results found for: \"BETA-HYDROXYBUTYRATE\"                           Results from " last 7 days   Lab Units 06/30/25  1626   PROTIME seconds 13.6   INR  0.99   PTT seconds 28         Results from last 7 days   Lab Units 06/30/25  1626   PROCALCITONIN ng/ml <0.05     Results from last 7 days   Lab Units 06/30/25  1626   LACTIC ACID mmol/L 0.7                                     Results from last 7 days   Lab Units 06/30/25  1626   CRP mg/L 44.7*   SED RATE mm/hour 19*                                             Results from last 7 days   Lab Units 06/30/25  1632 06/30/25  1626   BLOOD CULTURE  No Growth at 24 hrs. No Growth at 24 hrs.                   Network Utilization Review Department  ATTENTION: Please call with any questions or concerns to [phone] and carefully listen to the prompts so that you are directed to the right person. All voicemails are confidential.   For Discharge needs, contact Care Management DC Support Team at [phone] opt. 2  Send all requests for admission clinical reviews, approved or denied determinations and any other requests to dedicated fax number below belonging to the campus where the patient is receiving treatment. List of dedicated fax numbers for the Facilities:  FACILITY NAME UR FAX NUMBER   ADMISSION DENIALS (Administrative/Medical Necessity) [phone]   DISCHARGE SUPPORT TEAM (NETWORK) [phone]   PARENT CHILD HEALTH (Maternity/NICU/Pediatrics) [phone]   [facility] [phone]   [facility] [phone]   [facility] [phone]   [facility] [phone]   [facility] [phone]   [facility] [phone]   [facility] [phone]   [facility] [phone]   [facility] [phone]   [facility] [phone]   [facility] [phone]   [facility]  [facility] [phone]

## 2025-07-02 NOTE — PROGRESS NOTES
Progress Note - Hospitalist   Name: Luis Stinson 39 y.o. male I MRN: [id number]  Unit/Bed#: 2 [place] [address] A  Date of Admission: 6/30/2025   Date of Service: 7/2/2025 I Hospital Day: 2    Assessment & Plan  Cellulitis  Patient presented with right lower extremity erythema below the knee, with tenderness and pain on walking.  Patient thought initially started with what was ingrown hair in the prepatellar region.  Slowly improving    No evidence of joint involvement or orthopedic surgery.  May have started as prepatellar bursitis but does have swelling and mild erythema down to the ankle.  However no evidence of fluid collection or abscess.  Continue on IV vancomycin  Elevate leg  Monitor fever curve and WBC count  Serial exams    Prepatellar bursitis  Appreciate orthopedic surgery evaluation; no evidence of joint involvement  Elevated hemoglobin (HCC)  Hemoglobin 18.3 on admission though corrected with IV fluid hydration  Obesity (BMI 30-39.9)  Diet and exercise counseling and education     VTE Pharmacologic Prophylaxis:   Low Risk (Score 0-2) - Encourage Ambulation.    Mobility:   Basic Mobility Inpatient Raw Score: 18  JH-HLM Goal: 6: Walk 10 steps or more  JH-HLM Achieved: 7: Walk 25 feet or more  JH-HLM Goal achieved. Continue to encourage appropriate mobility.    Patient Centered Rounds: I performed bedside rounds with nursing staff today.   Discussions with Specialists or Other Care Team Provider: HUNG.     Education and Discussions with Family / Patient: Updated  (wife) at bedside.    Current Length of Stay: 2 day(s)  Current Patient Status: Inpatient   Certification Statement: The patient will continue to require additional inpatient hospital stay due to IV antibiotics, serial lab monitoring  Discharge Plan: Anticipate discharge in 24-48 hrs to home.    Code Status: Level 1 - Full Code    Subjective   Patient seen and examined.  Patient with some mild improvement in overall erythema and  swelling.  Will need to be observed for another 24 hours.  Fortunately no leukocytosis or fever.  Hemodynamically stable.    Objective :  Temp:  [98.2 °F (36.8 °C)-98.4 °F (36.9 °C)] 98.4 °F (36.9 °C)  HR:  [64-69] 69  BP: (135-148)/(83-88) 146/86  Resp:  [17-18] 17  SpO2:  [93 %-97 %] 96 %  O2 Device: None (Room air)    Body mass index is 34.04 kg/m².     Input and Output Summary (last 24 hours):     Intake/Output Summary (Last 24 hours) at 7/2/2025 1504  Last data filed at 7/2/2025 0730  Gross per 24 hour   Intake 240 ml   Output --   Net 240 ml     PHYSICAL EXAM:    Vitals signs reviewed  Constitutional   Awake and cooperative. NAD.   Head/Neck   Normocephalic. Atraumatic.   HEENT   No scleral icterus. EOMI.   Heart   Regular rate and rhythm. No murmurs.   Lungs   Clear to auscultation bilaterally. Respirations unlaboured.   Abdomen   Soft. Nontender. Nondistended.    Skin   Skin color normal. No rashes.   Extremities Improved erythema in right lower extremity, improving edema as well.  No open sores or areas of fluctuance/induration   Neuro   Alert and oriented. No new deficits.   Psych   Mood stable. Affect normal.                 Lab Results: I have reviewed the following results:   Results from last 7 days   Lab Units 07/02/25  0500 07/01/25  0650   WBC Thousand/uL 6.98 8.50   HEMOGLOBIN g/dL 15.7 16.1   HEMATOCRIT % 47.4 48.8   PLATELETS Thousands/uL 260 273   SEGS PCT %  --  61   LYMPHO PCT %  --  21   MONO PCT %  --  13*   EOS PCT %  --  3     Results from last 7 days   Lab Units 07/02/25  0500 07/01/25  0650 06/30/25  1626   SODIUM mmol/L 139   < > 135   POTASSIUM mmol/L 3.8   < > 4.1   CHLORIDE mmol/L 104   < > 99   CO2 mmol/L 28   < > 29   BUN mg/dL 18   < > 15   CREATININE mg/dL 0.99   < > 1.11   ANION GAP mmol/L 7   < > 7   CALCIUM mg/dL 9.0   < > 9.6   ALBUMIN g/dL  --   --  4.7   TOTAL BILIRUBIN mg/dL  --   --  1.30*   ALK PHOS U/L  --   --  67   ALT U/L  --   --  24   AST U/L  --   --  15   GLUCOSE  RANDOM mg/dL 111   < > 97    < > = values in this interval not displayed.     Results from last 7 days   Lab Units 06/30/25  1626   INR  0.99             Results from last 7 days   Lab Units 06/30/25  1626   LACTIC ACID mmol/L 0.7   PROCALCITONIN ng/ml <0.05       Recent Cultures (last 7 days):   Results from last 7 days   Lab Units 06/30/25  1632 06/30/25  1626   BLOOD CULTURE  No Growth at 24 hrs. No Growth at 24 hrs.       Imaging Results Review: No pertinent imaging studies reviewed.  Other Study Results Review: No additional pertinent studies reviewed.    Last 24 Hours Medication List:     Current Facility-Administered Medications:     acetaminophen (TYLENOL) tablet 650 mg, Q6H PRN    vancomycin (VANCOCIN) 1,250 mg in sodium chloride 0.9 % 250 mL IVPB, Q12H, Last Rate: 1,250 mg (07/02/25 0910)    Administrative Statements   Today, Patient Was Seen By: Brandon Quevedo, DO  I have spent a total time of 38 minutes in caring for this patient on the day of the visit/encounter including Diagnostic results, Prognosis, Risks and benefits of tx options, Instructions for management, Patient and family education, Importance of tx compliance, Risk factor reductions, Impressions, Counseling / Coordination of care, Documenting in the medical record, Reviewing/placing orders in the medical record (including tests, medications, and/or procedures), Obtaining or reviewing history  , and Communicating with other healthcare professionals .    **Please Note: This note may have been constructed using a voice recognition system.**

## 2025-07-02 NOTE — PLAN OF CARE
Problem: Potential for Falls  Goal: Patient will remain free of falls  Description: INTERVENTIONS:  - Educate patient/family on patient safety including physical limitations  - Instruct patient to call for assistance with activity   - Consider consulting OT/PT to assist with strengthening/mobility based on AM PAC & JH-HLM score  - Consult OT/PT to assist with strengthening/mobility   - Keep Call bell within reach  - Keep bed low and locked with side rails adjusted as appropriate  - Keep care items and personal belongings within reach  - Initiate and maintain comfort rounds  - Make Fall Risk Sign visible to staff  - Offer Toileting every 2 Hours, in advance of need  - Initiate/Maintain bed alarm  - Obtain necessary fall risk management equipment: alarm  - Apply yellow socks and bracelet for high fall risk patients  - Consider moving patient to room near nurses station  Outcome: Progressing     Problem: Prexisting or High Potential for Compromised Skin Integrity  Goal: Skin integrity is maintained or improved  Description: INTERVENTIONS:  - Identify patients at risk for skin breakdown  - Assess and monitor skin integrity including under and around medical devices   - Assess and monitor nutrition and hydration status  - Monitor labs  - Assess for incontinence   - Turn and reposition patient  - Assist with mobility/ambulation  - Relieve pressure over jassi prominences   - Avoid friction and shearing  - Provide appropriate hygiene as needed including keeping skin clean and dry  - Evaluate need for skin moisturizer/barrier cream  - Collaborate with interdisciplinary team  - Patient/family teaching  - Consider wound care consult    Assess:  - Review Merlin scale daily  - Clean and moisturize skin every shift  - Inspect skin when repositioning, toileting, and assisting with ADLS  - Assess under medical devices such as bandage every shift  - Assess extremities for adequate circulation and sensation     Bed Management:  -  Have minimal linens on bed & keep smooth, unwrinkled  - Change linens as needed when moist or perspiring  - Avoid sitting or lying in one position for more than 2 hours while in bed?Keep HOB at 30 degrees   - Toileting:  - Offer bedside commode  - Assess for incontinence every 2 hrs  - Use incontinent care products after each incontinent episode such as skin barrier cream    Activity:  - Mobilize patient 3 times a day  - Encourage activity and walks on unit  - Encourage or provide ROM exercises   - Turn and reposition patient every 2 Hours  - Use appropriate equipment to lift or move patient in bed  - Instruct/ Assist with weight shifting every 30 mins when out of bed in chair  - Consider limitation of chair time 2 hour intervals    Skin Care:  - Avoid use of baby powder, tape, friction and shearing, hot water or constrictive clothing  - Relieve pressure over bony prominences using pillows  - Do not massage red bony areas    Next Steps:  - Teach patient strategies to minimize risks such as skin breakdown  - Consider consults to  interdisciplinary teams such as pt/ot  Outcome: Progressing

## 2025-07-02 NOTE — PROGRESS NOTES
Luis Stinson is a 39 y.o. male who is currently ordered Vancomycin IV with management by the Pharmacy Consult service.  Relevant clinical data and objective / subjective history reviewed.  Vancomycin Assessment:  Indication and Goal AUC/Trough: Soft tissue (goal -600, trough >10)  Clinical Status: stable  Micro:     Renal Function:  SCr: 0.99 mg/dL  CrCl: 127.3 mL/min  Renal replacement: Not on dialysis  Days of Therapy: 3  Current Dose: 1250 mg Q12H  Vancomycin Plan:  New Dosing: Continue 1250 mg Q12H  Estimated AUC: 434 mcg*hr/mL  Estimated Trough: 12.1 mcg/mL  Next Level: 7/3/25 at 0600  Renal Function Monitoring: Daily BMP and UOP  Pharmacy will continue to follow closely for s/sx of nephrotoxicity, infusion reactions and appropriateness of therapy.  BMP and CBC will be ordered per protocol. We will continue to follow the patient’s culture results and clinical progress daily.    Tamanna Quiroz, Pharmacist

## 2025-07-02 NOTE — ASSESSMENT & PLAN NOTE
Patient presented with right lower extremity erythema below the knee, with tenderness and pain on walking.  Patient thought initially started with what was ingrown hair in the prepatellar region.  Slowly improving    No evidence of joint involvement or orthopedic surgery.  May have started as prepatellar bursitis but does have swelling and mild erythema down to the ankle.  However no evidence of fluid collection or abscess.  Continue on IV vancomycin  Elevate leg  Monitor fever curve and WBC count  Serial exams

## 2025-07-03 ENCOUNTER — TRANSITIONAL CARE MANAGEMENT (OUTPATIENT)
Dept: FAMILY MEDICINE CLINIC | Facility: CLINIC | Age: 40
End: 2025-07-03

## 2025-07-03 VITALS
RESPIRATION RATE: 20 BRPM | BODY MASS INDEX: 34 KG/M2 | HEIGHT: 72 IN | TEMPERATURE: 98.4 F | WEIGHT: 251 LBS | OXYGEN SATURATION: 95 % | DIASTOLIC BLOOD PRESSURE: 83 MMHG | SYSTOLIC BLOOD PRESSURE: 143 MMHG | HEART RATE: 79 BPM

## 2025-07-03 PROBLEM — D58.2 ELEVATED HEMOGLOBIN (HCC): Status: RESOLVED | Noted: 2025-06-30 | Resolved: 2025-07-03

## 2025-07-03 LAB
ANION GAP SERPL CALCULATED.3IONS-SCNC: 3 MMOL/L (ref 4–13)
BUN SERPL-MCNC: 13 MG/DL (ref 5–25)
CALCIUM SERPL-MCNC: 8.8 MG/DL (ref 8.4–10.2)
CHLORIDE SERPL-SCNC: 104 MMOL/L (ref 96–108)
CO2 SERPL-SCNC: 29 MMOL/L (ref 21–32)
CREAT SERPL-MCNC: 1 MG/DL (ref 0.6–1.3)
ERYTHROCYTE [DISTWIDTH] IN BLOOD BY AUTOMATED COUNT: 12.5 % (ref 11.6–15.1)
GFR SERPL CREATININE-BSD FRML MDRD: 93 ML/MIN/1.73SQ M
GLUCOSE SERPL-MCNC: 122 MG/DL (ref 65–140)
HCT VFR BLD AUTO: 46.5 % (ref 36.5–49.3)
HGB BLD-MCNC: 15.9 G/DL (ref 12–17)
MCH RBC QN AUTO: 28.8 PG (ref 26.8–34.3)
MCHC RBC AUTO-ENTMCNC: 34.2 G/DL (ref 31.4–37.4)
MCV RBC AUTO: 84 FL (ref 82–98)
PLATELET # BLD AUTO: 255 THOUSANDS/UL (ref 149–390)
PMV BLD AUTO: 10.4 FL (ref 8.9–12.7)
POTASSIUM SERPL-SCNC: 4.3 MMOL/L (ref 3.5–5.3)
RBC # BLD AUTO: 5.52 MILLION/UL (ref 3.88–5.62)
SODIUM SERPL-SCNC: 136 MMOL/L (ref 135–147)
VANCOMYCIN SERPL-MCNC: 11.5 UG/ML (ref 10–20)
WBC # BLD AUTO: 7.21 THOUSAND/UL (ref 4.31–10.16)

## 2025-07-03 PROCEDURE — 80202 ASSAY OF VANCOMYCIN: CPT

## 2025-07-03 PROCEDURE — 99239 HOSP IP/OBS DSCHRG MGMT >30: CPT | Performed by: INTERNAL MEDICINE

## 2025-07-03 PROCEDURE — 85027 COMPLETE CBC AUTOMATED: CPT | Performed by: INTERNAL MEDICINE

## 2025-07-03 PROCEDURE — 80048 BASIC METABOLIC PNL TOTAL CA: CPT | Performed by: INTERNAL MEDICINE

## 2025-07-03 RX ORDER — CEPHALEXIN 500 MG/1
500 CAPSULE ORAL EVERY 6 HOURS SCHEDULED
Status: DISCONTINUED | OUTPATIENT
Start: 2025-07-03 | End: 2025-07-03

## 2025-07-03 RX ORDER — DOXYCYCLINE 100 MG/1
100 CAPSULE ORAL EVERY 12 HOURS SCHEDULED
Status: DISCONTINUED | OUTPATIENT
Start: 2025-07-03 | End: 2025-07-03 | Stop reason: HOSPADM

## 2025-07-03 RX ORDER — CEPHALEXIN 500 MG/1
500 CAPSULE ORAL EVERY 6 HOURS SCHEDULED
Status: DISCONTINUED | OUTPATIENT
Start: 2025-07-03 | End: 2025-07-03 | Stop reason: HOSPADM

## 2025-07-03 RX ORDER — CEPHALEXIN 500 MG/1
500 CAPSULE ORAL EVERY 6 HOURS SCHEDULED
Qty: 23 CAPSULE | Refills: 0 | Status: SHIPPED | OUTPATIENT
Start: 2025-07-03 | End: 2025-07-09

## 2025-07-03 RX ORDER — DOXYCYCLINE 100 MG/1
100 CAPSULE ORAL EVERY 12 HOURS SCHEDULED
Qty: 11 CAPSULE | Refills: 0 | Status: SHIPPED | OUTPATIENT
Start: 2025-07-03 | End: 2025-07-09

## 2025-07-03 RX ADMIN — DOXYCYCLINE 100 MG: 100 CAPSULE ORAL at 09:27

## 2025-07-03 RX ADMIN — Medication 3 MG: at 00:31

## 2025-07-03 RX ADMIN — ACETAMINOPHEN 650 MG: 325 TABLET, FILM COATED ORAL at 08:55

## 2025-07-03 RX ADMIN — CEPHALEXIN 500 MG: 500 CAPSULE ORAL at 09:27

## 2025-07-03 NOTE — NURSING NOTE
Pt dc home, ambulated to lobby, denied staff escort. IV removed per policy and procedure, occlusive dressing intact. AVS reviewed with pt, verbalized understanding. Pt left with all belongings.

## 2025-07-03 NOTE — DISCHARGE INSTR - AVS FIRST PAGE
Mr Stinson,   You were admitted to the hospital for evaluation and management of a skin infection on your right leg.  This is called cellulitis.  Fortunately did you did not have any worrisome signs or symptoms to suggest a deeper infection into the bone, or fluid collection such as an abscess.  You were treated with a medicine called IV vancomycin during your hospital stay with significant improvement.  It may take a few days for the residual pain, stiffness, and swelling to resolve completely.  I have prescribed 2 antibiotics for you to take on discharge called Keflex and doxycycline.  I have provided a 6-day course, please be sure to  your prescription on the way home from the hospital.  As discussed, please avoid direct exposure to sunlight while taking doxycycline as this can cause a photosensitivity rash.  If you have recurrence of your pain, swelling, redness, or fevers at home, please call your family physician or return to the emergency room.    Otherwise it was a pleasure to care for you during your hospital stay,  Dr. Brandon Quevedo  [facility] internal medicine  [phone]

## 2025-07-03 NOTE — ASSESSMENT & PLAN NOTE
Patient presented with right lower extremity erythema below the knee, with tenderness and pain on walking.  Patient thought initially started with what was ingrown hair in the prepatellar region.  Improved while on the IV vancomycin during his hospitalization  Seen by orthopedic surgery who felt there was no concern for deeper infection or joint involvement    Will transition to p.o. doxycycline and p.o. Keflex x 6 days on discharge  Outpatient follow-up with PCP in 1 week

## 2025-07-03 NOTE — CONSULTS
Vancomycin IV Pharmacy-to-Dose Consultation     Vancomycin has been discontinued.  Pharmacy will sign off.  Please contact or re-consult with questions.    Tamanna Quiroz, Pharmacist

## 2025-07-03 NOTE — DISCHARGE SUMMARY
Discharge Summary - Hospitalist   Name: Luis Stinson 39 y.o. male I MRN: [id number]  Unit/Bed#: 2 [address] Date of Admission: 6/30/2025   Date of Service: 7/3/2025 I Hospital Day: 3     Assessment & Plan  Cellulitis  Patient presented with right lower extremity erythema below the knee, with tenderness and pain on walking.  Patient thought initially started with what was ingrown hair in the prepatellar region.  Improved while on the IV vancomycin during his hospitalization  Seen by orthopedic surgery who felt there was no concern for deeper infection or joint involvement    Will transition to p.o. doxycycline and p.o. Keflex x 6 days on discharge  Outpatient follow-up with PCP in 1 week  Prepatellar bursitis  Appreciate orthopedic surgery evaluation; no evidence of joint involvement  Elevated hemoglobin (HCC) (Resolved: 7/3/2025)  Hemoglobin 18.3 on admission though corrected with IV fluid hydration  Obesity (BMI 30-39.9)  Diet and exercise counseling and education      Medical Problems       Resolved Problems  Date Reviewed: 7/3/2025          Resolved    Elevated hemoglobin (HCC) 7/3/2025     Resolved by  Brandon Quevedo DO        Discharging Physician / Practitioner: Brandon Quevedo DO  PCP: BRANDYN Geller  Admission Date:   Admission Orders (From admission, onward)       Ordered        06/30/25 1804  INPATIENT ADMISSION  Once                          Discharge Date: 07/03/25    Next Steps for Physician/AP Assuming Care:  Routine outpatient follow-up, ensure resolution of infection.    Test Results Pending at Discharge (will require follow up):  none    Medication Changes for Discharge & Rationale:   P.o. Keflex 500 mg every 6 hours x 6 days, p.o. doxycycline 100 mg twice daily x 6 days on discharge  See after visit summary for reconciled discharge medications provided to patient and/or family.     Consultations During Hospital Stay:  Orthopedic surgery    Procedures Performed:    none    Significant Findings / Test Results:   Cellulitis of the RLE    Incidental Findings:   none     Hospital Course:   Luis Stinson is a 39 y.o. male patient who originally presented to the hospital on 6/30/2025 due to redness, swelling, pain of the right lower extremity.  The patient states that initially began in the area in the prepatellar region which she thought was a ingrown hair.  The pain worsened and then erythema spread from the knee to the ankle.  He was having difficulty bearing weight prompting his presentation to the ER.  Workup in the ER was generally benign and he was without fever and only with mild leukocytosis.  However given the extensive nature of the erythema he was admitted to the hospital service for further evaluation and management of cellulitis.  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  He improved over the course of 72 hours and erythema largely resolved by the time of discharge.  He does have some residual pain in the prepatellar region and some residual edema however this is improving.  He will be discharged on p.o. doxycycline and p.o. Keflex to complete an additional 6 days of antibiotics on discharge.  He was advised to follow-up with his PCP within 1 week of discharge, or return for evaluation to the ER if he develops recurrent fevers, worsening pain, or recurrence of his erythema and swelling.  All questions and concerns were addressed prior to discharge.  Patient was eager to return home.    Please see above list of diagnoses and related plan for additional information.     Discharge Day Visit / Exam:   Subjective: Patient seen and examined on the day of discharge.  He is feeling well.  He remains afebrile.  No leukocytosis.  Still has some residual pain and edema, though improving.  Vitals: Blood Pressure: 143/83 (07/03/25 0820)  Pulse: 79 (07/03/25 0820)  Temperature: 98.4 °F (36.9 °C)  (07/03/25 0820)  Temp Source: Oral (07/03/25 0820)  Respirations: 20 (07/03/25 0820)  Height: 6' (182.9 cm) (06/30/25 2241)  Weight - Scale: 114 kg (251 lb) (06/30/25 1758)  SpO2: 95 % (07/03/25 0820)  PHYSICAL EXAM:    Vitals signs reviewed  Constitutional   Awake and cooperative. NAD.   Head/Neck   Normocephalic. Atraumatic.   HEENT   No scleral icterus. EOMI.   Heart   Regular rate and rhythm. No murmurs.   Lungs   Clear to auscultation bilaterally. Respirations unlaboured.   Abdomen   Soft. Nontender. Nondistended.    Skin   Skin color normal. No rashes.   Extremities Erythema of the right lower extremity essentially resolved.  Only some very mild erythema residual in the peripatellar region.  There is some residual pitting edema in the prepatellar region but the swelling throughout the rest of the right lower extremity is much improved.  Again no areas of fluctuance, induration.   Neuro   Alert and oriented. No new deficits.   Psych   Mood stable. Affect normal.         Discussion with Family: Patient declined call to .     Discharge instructions/Information to patient and family:   See after visit summary for information provided to patient and family.      Provisions for Follow-Up Care:  See after visit summary for information related to follow-up care and any pertinent home health orders.      Mobility at time of Discharge:   Basic Mobility Inpatient Raw Score: 18  JH-HLM Goal: 6: Walk 10 steps or more  JH-HLM Achieved: 7: Walk 25 feet or more  HLM Goal achieved. Continue to encourage appropriate mobility.     Disposition:   Home    Planned Readmission: No    Administrative Statements   Discharge Statement:  I have spent a total time of 45 minutes in caring for this patient on the day of the visit/encounter. >30 minutes of time was spent on: Diagnostic results, Prognosis, Risks and benefits of tx options, Instructions for management, Patient and family education, Importance of tx compliance,  Risk factor reductions, Impressions, Counseling / Coordination of care, Documenting in the medical record, Reviewing / ordering tests, medicine, procedures  , and Communicating with other healthcare professionals .    **Please Note: This note may have been constructed using a voice recognition system**

## 2025-07-03 NOTE — PROGRESS NOTES
Luis Stinson is a 39 y.o. male who is currently ordered Vancomycin IV with management by the Pharmacy Consult service.  Relevant clinical data and objective / subjective history reviewed.  Vancomycin Assessment:  Indication and Goal AUC/Trough: Soft tissue (goal -600, trough >10), -600, trough >10  Clinical Status: stable  Micro:     Renal Function:  SCr: 1.00 mg/dL  CrCl: 126 mL/min  Renal replacement: Not on dialysis  Days of Therapy: 4  Current Dose: 1250 mg IV q12h  Vancomycin Plan:  New Dosin mg IV q12h  Estimated AUC: 460 mcg*hr/mL  Estimated Trough: 12.3 mcg/mL  Next Level: 25 at 0600  Renal Function Monitoring: Daily BMP and UOP  Pharmacy will continue to follow closely for s/sx of nephrotoxicity, infusion reactions and appropriateness of therapy.  BMP and CBC will be ordered per protocol. We will continue to follow the patient’s culture results and clinical progress daily.    Tamanna Quiroz, Pharmacist

## 2025-07-06 LAB
BACTERIA BLD CULT: NORMAL
BACTERIA BLD CULT: NORMAL

## 2025-07-18 ENCOUNTER — TRANSITIONAL CARE MANAGEMENT (OUTPATIENT)
Dept: FAMILY MEDICINE CLINIC | Facility: CLINIC | Age: 40
End: 2025-07-18

## 2025-07-18 ENCOUNTER — APPOINTMENT (OUTPATIENT)
Dept: LAB | Facility: CLINIC | Age: 40
End: 2025-07-18
Attending: NURSE PRACTITIONER
Payer: COMMERCIAL

## 2025-07-18 ENCOUNTER — OFFICE VISIT (OUTPATIENT)
Dept: FAMILY MEDICINE CLINIC | Facility: CLINIC | Age: 40
End: 2025-07-18
Payer: COMMERCIAL

## 2025-07-18 VITALS
SYSTOLIC BLOOD PRESSURE: 144 MMHG | TEMPERATURE: 96.9 F | DIASTOLIC BLOOD PRESSURE: 66 MMHG | HEIGHT: 72 IN | HEART RATE: 64 BPM | WEIGHT: 249 LBS | BODY MASS INDEX: 33.72 KG/M2

## 2025-07-18 DIAGNOSIS — M70.41 PREPATELLAR BURSITIS OF RIGHT KNEE: ICD-10-CM

## 2025-07-18 DIAGNOSIS — L03.115 CELLULITIS OF RIGHT LEG: ICD-10-CM

## 2025-07-18 DIAGNOSIS — Z13.6 SCREENING FOR CARDIOVASCULAR CONDITION: ICD-10-CM

## 2025-07-18 DIAGNOSIS — R53.83 OTHER FATIGUE: ICD-10-CM

## 2025-07-18 DIAGNOSIS — M70.41 PREPATELLAR BURSITIS OF RIGHT KNEE: Primary | ICD-10-CM

## 2025-07-18 DIAGNOSIS — Z13.29 SCREENING FOR THYROID DISORDER: ICD-10-CM

## 2025-07-18 LAB
ALBUMIN SERPL BCG-MCNC: 4.4 G/DL (ref 3.5–5)
ALP SERPL-CCNC: 65 U/L (ref 34–104)
ALT SERPL W P-5'-P-CCNC: 45 U/L (ref 7–52)
ANION GAP SERPL CALCULATED.3IONS-SCNC: 9 MMOL/L (ref 4–13)
AST SERPL W P-5'-P-CCNC: 27 U/L (ref 13–39)
BASOPHILS # BLD AUTO: 0.08 THOUSANDS/ÂΜL (ref 0–0.1)
BASOPHILS NFR BLD AUTO: 2 % (ref 0–1)
BILIRUB SERPL-MCNC: 0.96 MG/DL (ref 0.2–1)
BUN SERPL-MCNC: 12 MG/DL (ref 5–25)
CALCIUM SERPL-MCNC: 9.5 MG/DL (ref 8.4–10.2)
CHLORIDE SERPL-SCNC: 102 MMOL/L (ref 96–108)
CO2 SERPL-SCNC: 28 MMOL/L (ref 21–32)
CREAT SERPL-MCNC: 0.98 MG/DL (ref 0.6–1.3)
EOSINOPHIL # BLD AUTO: 0.1 THOUSAND/ÂΜL (ref 0–0.61)
EOSINOPHIL NFR BLD AUTO: 2 % (ref 0–6)
ERYTHROCYTE [DISTWIDTH] IN BLOOD BY AUTOMATED COUNT: 12.4 % (ref 11.6–15.1)
GFR SERPL CREATININE-BSD FRML MDRD: 95 ML/MIN/1.73SQ M
GLUCOSE SERPL-MCNC: 102 MG/DL (ref 65–140)
HCT VFR BLD AUTO: 49 % (ref 36.5–49.3)
HGB BLD-MCNC: 17 G/DL (ref 12–17)
IMM GRANULOCYTES # BLD AUTO: 0.01 THOUSAND/UL (ref 0–0.2)
IMM GRANULOCYTES NFR BLD AUTO: 0 % (ref 0–2)
LYMPHOCYTES # BLD AUTO: 1.7 THOUSANDS/ÂΜL (ref 0.6–4.47)
LYMPHOCYTES NFR BLD AUTO: 32 % (ref 14–44)
MCH RBC QN AUTO: 28.6 PG (ref 26.8–34.3)
MCHC RBC AUTO-ENTMCNC: 34.7 G/DL (ref 31.4–37.4)
MCV RBC AUTO: 82 FL (ref 82–98)
MONOCYTES # BLD AUTO: 0.52 THOUSAND/ÂΜL (ref 0.17–1.22)
MONOCYTES NFR BLD AUTO: 10 % (ref 4–12)
NEUTROPHILS # BLD AUTO: 2.96 THOUSANDS/ÂΜL (ref 1.85–7.62)
NEUTS SEG NFR BLD AUTO: 54 % (ref 43–75)
NRBC BLD AUTO-RTO: 0 /100 WBCS
PLATELET # BLD AUTO: 305 THOUSANDS/UL (ref 149–390)
PMV BLD AUTO: 11 FL (ref 8.9–12.7)
POTASSIUM SERPL-SCNC: 4.1 MMOL/L (ref 3.5–5.3)
PROT SERPL-MCNC: 7.2 G/DL (ref 6.4–8.4)
PSA SERPL-MCNC: 1.09 NG/ML (ref 0–4)
RBC # BLD AUTO: 5.95 MILLION/UL (ref 3.88–5.62)
SODIUM SERPL-SCNC: 139 MMOL/L (ref 135–147)
TSH SERPL DL<=0.05 MIU/L-ACNC: 1.5 UIU/ML (ref 0.45–4.5)
WBC # BLD AUTO: 5.37 THOUSAND/UL (ref 4.31–10.16)

## 2025-07-18 PROCEDURE — 99214 OFFICE O/P EST MOD 30 MIN: CPT | Performed by: NURSE PRACTITIONER

## 2025-07-18 PROCEDURE — 84443 ASSAY THYROID STIM HORMONE: CPT

## 2025-07-18 PROCEDURE — G0103 PSA SCREENING: HCPCS

## 2025-07-18 PROCEDURE — 86618 LYME DISEASE ANTIBODY: CPT

## 2025-07-18 PROCEDURE — 80053 COMPREHEN METABOLIC PANEL: CPT

## 2025-07-18 PROCEDURE — 85025 COMPLETE CBC W/AUTO DIFF WBC: CPT

## 2025-07-18 PROCEDURE — 36415 COLL VENOUS BLD VENIPUNCTURE: CPT

## 2025-07-18 RX ORDER — METHYLPREDNISOLONE 4 MG/1
TABLET ORAL
Qty: 1 EACH | Refills: 0 | Status: SHIPPED | OUTPATIENT
Start: 2025-07-18 | End: 2025-07-24

## 2025-07-18 NOTE — PROGRESS NOTES
Transition of Care Visit:  Name: Luis Stinson      : 1985      MRN: [id number]  Encounter Provider: BRANDYN Geller  Encounter Date: 2025   Encounter department: [facility]    Assessment & Plan  Prepatellar bursitis of right knee  Will check Lyme titer, MRI ordered for further evaluation given his continued pain and swelling.  Medrol Dosepak if pain worsens, but prefers to hold off if not necessary necessary  Orders:    Lyme Total AB W Reflex to IGM/IGG; Future    MRI knee right w wo contrast; Future    methylPREDNISolone 4 MG tablet therapy pack; Use as directed on package    Cellulitis of right leg    Orders:    Lyme Total AB W Reflex to IGM/IGG; Future    MRI knee right w wo contrast; Future         History of Present Illness     Transitional Care Management Review:   Luis Stinson is a 39 y.o. male here for TCM follow up.     During the TCM phone call patient stated:  TCM Call (since 2025)       Date and time call was made  2025  1:08 PM    Hospital care reviewed  Records reviewed    Patient was hospitialized at  [facility]    Date of Admission  25    Date of discharge  25    Diagnosis  Cellulitis    Disposition  Home    Were the patients medications reviewed and updated  Yes    Current Symptoms  Leg pain - right side          TCM Call (since 2025)       Post hospital issues  None; Reduced activity    Scheduled for follow up?  Yes    Did you obtain your prescribed medications  Yes    Do you need help managing your prescriptions or medications  No    Is transportation to your appointment needed  No    I have advised the patient to call PCP with any new or worsening symptoms  Brianda Rodriguez CMA    Living Arrangements  Family members    Support System  Family    The type of support provided  None    Do you have social support  Yes, some    Are you recieving home care services  No          Here today for hospital follow up.  Was admitted  for right leg cellulitis.  Had what he thought he had an ingrown hair below the knee.  About a week later, the area started to feel swollen and red, felt like there was pus.  White blood cell count was elevated, x-ray was normal.  He was given IV antibiotics throughout the hospital stay, and discharged on doxycycline and Keflex.  The redness has resolved, however still has pain with bearing weight and kneeling on the knee.  The area below the knee feels firm to the touch.         Review of Systems   Constitutional: Negative.    Musculoskeletal:         See HPI     Objective   /66 (BP Location: Left arm, Patient Position: Sitting, Cuff Size: Large)   Pulse 64   Temp (!) 96.9 °F (36.1 °C) (Tympanic)   Ht 6' (1.829 m)   Wt 113 kg (249 lb)   BMI 33.77 kg/m²     Physical Exam  Vitals and nursing note reviewed.   Constitutional:       General: He is not in acute distress.     Appearance: Normal appearance. He is well-developed. He is not diaphoretic.     Eyes:      Conjunctiva/sclera: Conjunctivae normal.     Pulmonary:      Effort: Pulmonary effort is normal. No respiratory distress.     Musculoskeletal:      Right knee: Swelling present. No erythema or ecchymosis. Deformity: distal to patella.Normal range of motion.     Neurological:      Mental Status: He is alert.     Psychiatric:         Mood and Affect: Mood normal.         Behavior: Behavior normal.       Medications have been reviewed by provider in current encounter

## 2025-07-18 NOTE — ASSESSMENT & PLAN NOTE
Will check Lyme titer, MRI ordered for further evaluation given his continued pain and swelling.  Medrol Dosepak if pain worsens, but prefers to hold off if not necessary necessary  Orders:    Lyme Total AB W Reflex to IGM/IGG; Future    MRI knee right w wo contrast; Future    methylPREDNISolone 4 MG tablet therapy pack; Use as directed on package

## 2025-07-19 LAB — B BURGDOR IGG+IGM SER QL IA: NEGATIVE
